# Patient Record
Sex: MALE | Race: WHITE | NOT HISPANIC OR LATINO | Employment: OTHER | URBAN - METROPOLITAN AREA
[De-identification: names, ages, dates, MRNs, and addresses within clinical notes are randomized per-mention and may not be internally consistent; named-entity substitution may affect disease eponyms.]

---

## 2017-01-20 RX ORDER — CARVEDILOL 6.25 MG/1
TABLET ORAL
Qty: 180 TABLET | Refills: 1 | Status: SHIPPED | OUTPATIENT
Start: 2017-01-20 | End: 2017-07-14 | Stop reason: SDUPTHER

## 2017-07-14 RX ORDER — CARVEDILOL 6.25 MG/1
TABLET ORAL
Qty: 180 TABLET | Refills: 1 | Status: SHIPPED | OUTPATIENT
Start: 2017-07-14 | End: 2017-07-19 | Stop reason: SDUPTHER

## 2017-07-19 ENCOUNTER — OFFICE VISIT (OUTPATIENT)
Dept: CARDIOLOGY | Facility: CLINIC | Age: 62
End: 2017-07-19

## 2017-07-19 VITALS
WEIGHT: 256 LBS | DIASTOLIC BLOOD PRESSURE: 89 MMHG | HEART RATE: 68 BPM | BODY MASS INDEX: 36.65 KG/M2 | SYSTOLIC BLOOD PRESSURE: 158 MMHG | HEIGHT: 70 IN

## 2017-07-19 DIAGNOSIS — I25.10 CORONARY ARTERY DISEASE INVOLVING NATIVE CORONARY ARTERY OF NATIVE HEART WITHOUT ANGINA PECTORIS: Primary | Chronic | ICD-10-CM

## 2017-07-19 DIAGNOSIS — I10 ESSENTIAL HYPERTENSION: Chronic | ICD-10-CM

## 2017-07-19 DIAGNOSIS — I50.32 CHRONIC DIASTOLIC HEART FAILURE (HCC): ICD-10-CM

## 2017-07-19 DIAGNOSIS — E78.2 MIXED HYPERLIPIDEMIA: Chronic | ICD-10-CM

## 2017-07-19 DIAGNOSIS — Z00.00 HEALTHCARE MAINTENANCE: ICD-10-CM

## 2017-07-19 PROCEDURE — 99214 OFFICE O/P EST MOD 30 MIN: CPT | Performed by: INTERNAL MEDICINE

## 2017-07-19 RX ORDER — OMEPRAZOLE 20 MG/1
20 CAPSULE, DELAYED RELEASE ORAL DAILY
COMMUNITY

## 2017-07-19 RX ORDER — DULOXETIN HYDROCHLORIDE 30 MG/1
60 CAPSULE, DELAYED RELEASE ORAL DAILY
COMMUNITY
End: 2018-10-15 | Stop reason: SDUPTHER

## 2017-07-19 RX ORDER — ATORVASTATIN CALCIUM 80 MG/1
80 TABLET, FILM COATED ORAL DAILY
Qty: 90 TABLET | Refills: 3 | Status: SHIPPED | OUTPATIENT
Start: 2017-07-19 | End: 2018-08-15 | Stop reason: SDUPTHER

## 2017-07-19 RX ORDER — INSULIN DEGLUDEC 200 U/ML
50 INJECTION, SOLUTION SUBCUTANEOUS NIGHTLY
Refills: 0 | COMMUNITY
Start: 2017-07-03 | End: 2022-06-06

## 2017-07-19 RX ORDER — LOSARTAN POTASSIUM 100 MG/1
100 TABLET ORAL DAILY
Qty: 90 TABLET | Refills: 3 | Status: SHIPPED | OUTPATIENT
Start: 2017-07-19 | End: 2020-06-29 | Stop reason: SDUPTHER

## 2017-07-19 RX ORDER — CARVEDILOL 6.25 MG/1
6.25 TABLET ORAL 2 TIMES DAILY WITH MEALS
Qty: 180 TABLET | Refills: 3 | Status: SHIPPED | OUTPATIENT
Start: 2017-07-19 | End: 2018-05-02 | Stop reason: SDUPTHER

## 2017-07-19 NOTE — PROGRESS NOTES
[facility] AT [address], [address]  [place], [place], [address] [phone]  WWW.[facility]           OUTPATIENT CLINIC FOLLOW UP NOTE    Encounter Date:10/19/2016    Patient Care Team:  Patient Care Team:  GEOFF Abbasi as PCP - General    Subjective:   Reason for consultation:   Chief Complaint   Patient presents with   • Hypertension   • Hyperlipidemia   • Coronary Artery Disease       HPI:    Gen Torres is a 62 y.o. male.  Hypertension     Hyperlipidemia     Coronary Artery Disease   Risk factors include hyperlipidemia.     The patient is a 61-year-old gentleman with a history of CAD status post 5 vessel bypass and an even more remote stent (last [facility] 2015 with patient SVG to PDA, SVG to OM, LIMA to LAD and diag), hypertension, hyperlipidemia, diabetes.  He presents today for follow-up.      Since his last visit in the clinic, he continues to deny symptoms of angina such as exertional chest pain.  He does occasionally feel short of breath and feel little bit more swollen.  He takes a PRN furosemide and his symptoms improved.  Shortness of breath is mild in severity.  It was exacerbated when he was taking a trip through the [facility].  He has not had any symptoms of uncontrolled high blood pressure such as headaches or vision changes.  His fasting blood sugars have improved and his A1c reportedly is also improved since his last visit when he was having occasional lethargy and diaphoresis with that sugars over 200    PFSH:  Patient Active Problem List   Diagnosis   • CAD (coronary artery disease)   • Obesity   • Diabetes mellitus   • Hyperlipidemia   • Hypertension   • KAYLEIGH on CPAP   • ED (erectile dysfunction)   • Chronic diastolic heart failure         Current Outpatient Prescriptions:   •  aspirin 81 MG tablet, Take 81 mg by mouth Daily., Disp: , Rfl:   •  atorvastatin (LIPITOR) 80 MG tablet, Take 1 tablet by mouth Daily., Disp: 90 tablet, Rfl:  "3  •  carvedilol (COREG) 6.25 MG tablet, take 1 tablet by mouth twice a day, Disp: 180 tablet, Rfl: 1  •  DULoxetine (CYMBALTA) 30 MG capsule, Take 30 mg by mouth Daily., Disp: , Rfl:   •  losartan (COZAAR) 100 MG tablet, Take 100 mg by mouth Daily., Disp: , Rfl:   •  metFORMIN (GLUCOPHAGE) 500 MG tablet, Take 500 mg by mouth 2 (Two) Times a Day With Meals., Disp: , Rfl:   •  omeprazole (priLOSEC) 20 MG capsule, Take 20 mg by mouth Daily., Disp: , Rfl:   •  TRESIBA FLEXTOUCH 200 UNIT/ML solution pen-injector, 48 Units Daily., Disp: , Rfl: 0    Allergies   Allergen Reactions   • Iodine    • Spironolactone Diarrhea     He was not able to tolerate the spironolactone secondary to diarrhea.        reports that he quit smoking about 23 years ago. He does not have any smokeless tobacco history on file.    Family History   Problem Relation Age of Onset   • Heart attack Father        Review of Systems:  Positive for occasional dyspnea with exertion/\"smothering\"  Negative for exertional chest pain, orthopnea, PND, lower extremity edema, palpitations, lightheadedness, syncope.   Review of Systems  All other systems reviewed and are negative.    Objective:   Blood pressure 158/89, pulse 68, height 70\" (177.8 cm), weight 256 lb (116 kg).  Physical Exam  CONSTITUTIONAL: No acute distress, normal affect  RESPIRATORY: Normal effort. Clear to auscultation bilaterally without wheezing or rales  CARDIOVASCULAR: Jugular venous pressure within normal limits. Carotids with normal upstrokes without bruits.  Regular rate and rhythm with normal S1 and S2. Without murmur, gallop or rub.  PERIPHERAL VASCULAR: Normal radial pulses bilaterally, normal posterior tibial pulses bilaterally. There is no lower extremity edema bilaterally.      Labs:  Lab Results   Component Value Date    ALT 38 10/19/2016    AST 28 10/19/2016     Lab Results   Component Value Date    TRIG 257 (H) 07/10/2015    HDL 26 (L) 07/10/2015    LDLDIRECT 74 10/19/2016    " CREATININE 0.90 10/19/2016       Diagnostic Data:    Procedures  TTE 3/2015 results reviewed  Conclusions  1. The estimated ejection fraction is 50-55%.    2. Post surgical hypokinesis of the interventricular septum is observed  consistent with coronary artery bypass.    3. Abnormal left ventricular diastolic filling is observed, consistent  with impaired relaxation.    4. The tricuspid valve leaflets are normal.    5. The tricuspid valve leaflets are not thickened.  6. Tricuspid valve leaflet mobility appears normal.    Assessment and Plan:   Gen was seen today for hypertension, hyperlipidemia and coronary artery disease.    Diagnoses and all orders for this visit:    Coronary artery disease involving native coronary artery of native heart without angina pectoris  -CCS class 0, continue current medications including aspirin, carvedilol    Chronic diastolic heart failure  -Continue when necessary furosemide    Essential hypertension  -Blood pressure is mildly elevated today.  It was normal at a recent primary care visit as well as at his last visit with us.  We'll continue to monitor.  The patient will check his blood pressure a few times over the next couple weeks in the morning.  If the averages over 140/90, would increase his carvedilol to 12.5 mg twice a day.  -Otherwise, continue carvedilol, losartan    Mixed hyperlipidemia  -We'll obtain his recent blood work.  Continue atorvastatin    - Dietary and exercise counseling was provided during this visit  - Return in about 9 months (around 4/19/2018).    Hari Vargas MD, MSc, [facility]  Interventional Cardiology  [facility] at [facility]

## 2017-08-18 ENCOUNTER — DOCUMENTATION (OUTPATIENT)
Dept: CARDIOLOGY | Facility: CLINIC | Age: 62
End: 2017-08-18

## 2017-08-18 NOTE — PROGRESS NOTES
Outside hospital labs    8/5/17 labs-hemoglobin 14, platelets 126, A1c 6.4%, no lipid panel or liver function tests

## 2018-05-02 ENCOUNTER — OFFICE VISIT (OUTPATIENT)
Dept: CARDIOLOGY | Facility: CLINIC | Age: 63
End: 2018-05-02

## 2018-05-02 VITALS
HEIGHT: 70 IN | DIASTOLIC BLOOD PRESSURE: 95 MMHG | HEART RATE: 68 BPM | WEIGHT: 255 LBS | BODY MASS INDEX: 36.51 KG/M2 | SYSTOLIC BLOOD PRESSURE: 146 MMHG

## 2018-05-02 DIAGNOSIS — E78.2 MIXED HYPERLIPIDEMIA: ICD-10-CM

## 2018-05-02 DIAGNOSIS — I10 ESSENTIAL HYPERTENSION: Chronic | ICD-10-CM

## 2018-05-02 DIAGNOSIS — I50.32 CHRONIC DIASTOLIC HEART FAILURE (HCC): ICD-10-CM

## 2018-05-02 DIAGNOSIS — I25.118 CORONARY ARTERY DISEASE OF NATIVE ARTERY OF NATIVE HEART WITH STABLE ANGINA PECTORIS (HCC): Primary | ICD-10-CM

## 2018-05-02 PROCEDURE — 99214 OFFICE O/P EST MOD 30 MIN: CPT | Performed by: INTERNAL MEDICINE

## 2018-05-02 RX ORDER — FUROSEMIDE 20 MG/1
20 TABLET ORAL 2 TIMES DAILY
Qty: 60 TABLET | Refills: 11 | Status: ON HOLD | OUTPATIENT
Start: 2018-05-02 | End: 2018-05-09

## 2018-05-02 RX ORDER — CARVEDILOL 12.5 MG/1
12.5 TABLET ORAL 2 TIMES DAILY WITH MEALS
Qty: 60 TABLET | Refills: 11 | Status: SHIPPED | OUTPATIENT
Start: 2018-05-02 | End: 2019-02-20 | Stop reason: SDUPTHER

## 2018-05-02 NOTE — PROGRESS NOTES
[facility] AT [address], [address]  [place], [place], [address] [phone]  WWW[facility]           OUTPATIENT CLINIC FOLLOW UP NOTE    Patient Care Team:  Patient Care Team:  GEOFF Abbasi as PCP - General    Subjective:   Reason for consultation:   Chief Complaint   Patient presents with   • Coronary Artery Disease   • Hypertension       HPI:    Gen Torres is a 62 y.o. male.  Hypertension     Hyperlipidemia     Coronary Artery Disease   Risk factors include hyperlipidemia.     The patient has a history of CAD status post 5 vessel bypass and an even more remote stent (last [facility] 2015 with patient SVG to PDA, SVG to OM, LIMA to LAD and diag), hypertension, hyperlipidemia, diabetes.  He presents today for follow-up.      Since his last visit in the clinic, He has developed exertional shortness of breath and occasionally a atypical chest heaviness.  Over the last few weeks the patient has had a clear discernible shortness of breath.  He took Lasix for a few days which helped only partly.  He occasionally notices associated lower extremity swelling.  Once he felt a right-sided chest pain but this was self-limiting.  Occasionally he will feel a little heavy in the chest with this shortness of breath.  He denies significant seasonal allergies associated with the symptoms.      PFSH:  Patient Active Problem List   Diagnosis   • Coronary artery disease involving native coronary artery of native heart without angina pectoris   • Obesity   • Diabetes mellitus   • Mixed hyperlipidemia   • Essential hypertension   • KAYLEIGH on CPAP   • ED (erectile dysfunction)   • Chronic diastolic heart failure         Current Outpatient Prescriptions:   •  aspirin 81 MG tablet, Take 1 tablet by mouth Daily., Disp: 90 tablet, Rfl: 3  •  atorvastatin (LIPITOR) 80 MG tablet, Take 1 tablet by mouth Daily., Disp: 90 tablet, Rfl: 3  •  carvedilol (COREG) 12.5 MG tablet, Take 1 tablet by  "mouth 2 (Two) Times a Day With Meals., Disp: 60 tablet, Rfl: 11  •  DULoxetine (CYMBALTA) 30 MG capsule, Take 60 mg by mouth Daily., Disp: , Rfl:   •  losartan (COZAAR) 100 MG tablet, Take 1 tablet by mouth Daily., Disp: 90 tablet, Rfl: 3  •  metFORMIN (GLUCOPHAGE) 500 MG tablet, Take 500 mg by mouth 2 (Two) Times a Day With Meals., Disp: , Rfl:   •  omeprazole (priLOSEC) 20 MG capsule, Take 20 mg by mouth Daily., Disp: , Rfl:   •  TRESIBA FLEXTOUCH 200 UNIT/ML solution pen-injector, 50 Units Daily., Disp: , Rfl: 0  •  furosemide (LASIX) 20 MG tablet, Take 1 tablet by mouth 2 (Two) Times a Day., Disp: 60 tablet, Rfl: 11    Allergies   Allergen Reactions   • Spironolactone Diarrhea     He was not able to tolerate the spironolactone secondary to diarrhea.   • Iodine Rash        reports that he quit smoking about 24 years ago. He does not have any smokeless tobacco history on file.    Family History   Problem Relation Age of Onset   • Heart attack Father        Review of Systems:  Positive for occasional dyspnea with exertion/\"smothering\"  Negative for exertional chest pain, orthopnea, PND, lower extremity edema, palpitations, lightheadedness, syncope.   Review of Systems  All other systems reviewed and are negative.    Objective:   Blood pressure 146/95, pulse 68, height 177.8 cm (70\"), weight 116 kg (255 lb).  Physical Exam  CONSTITUTIONAL: No acute distress, normal affect  RESPIRATORY: Normal effort. Clear to auscultation bilaterally without wheezing or rales  CARDIOVASCULAR: Carotids with normal upstrokes without bruits.  Regular rate and rhythm with normal S1 and S2. Without murmur, gallop or rub.  PERIPHERAL VASCULAR: Normal radial pulses bilaterally, normal posterior tibial pulses bilaterally. There is no lower extremity edema bilaterally.      Labs:  Lab Results   Component Value Date    ALT 38 10/19/2016    AST 28 10/19/2016     Lab Results   Component Value Date    TRIG 257 (H) 07/10/2015    HDL 26 (L) " 07/10/2015    CREATININE 0.90 10/19/2016       Diagnostic Data:    Procedures  TTE 3/2015 results reviewed  Conclusions  1. The estimated ejection fraction is 50-55%.    2. Post surgical hypokinesis of the interventricular septum is observed  consistent with coronary artery bypass.    3. Abnormal left ventricular diastolic filling is observed, consistent  with impaired relaxation.    4. The tricuspid valve leaflets are normal.    5. The tricuspid valve leaflets are not thickened.  6. Tricuspid valve leaflet mobility appears normal.    Assessment and Plan:   Gen was seen today for hypertension, hyperlipidemia and coronary artery disease.    Diagnoses and all orders for this visit:    Coronary artery disease involving native coronary artery of native heart without angina pectoris  -CCS class 2, NYHA II-III symptoms  -Continue current medications including aspirin, carvedilol, atorvastatin  -[facility], left radial approach  -Repeat TTE at time of C    Chronic diastolic heart failure  -Furosemide 20mg daily for now  -Repeat K+ at time of [facility]  -Can also consider switching back to PRN     Essential hypertension  -Blood pressure is elevated today.    -Increased carvedilol to 12.5 mg twice a day.    Mixed hyperlipidemia  -Repeat FLP, LFTs at time of C    - Of note patient will be traveling for several months this summer starting in July. [place]    - No Follow-up on file.  Follow-up will be scheduled after heart catheter    Hari Vargas MD, MSc, [facility]  Interventional Cardiology  [facility] at [facility]

## 2018-05-03 ENCOUNTER — PREP FOR SURGERY (OUTPATIENT)
Dept: OTHER | Facility: HOSPITAL | Age: 63
End: 2018-05-03

## 2018-05-03 DIAGNOSIS — I25.118 CORONARY ARTERY DISEASE OF NATIVE ARTERY OF NATIVE HEART WITH STABLE ANGINA PECTORIS (HCC): Primary | ICD-10-CM

## 2018-05-03 DIAGNOSIS — E11.65 TYPE 2 DIABETES MELLITUS WITH HYPERGLYCEMIA, UNSPECIFIED LONG TERM INSULIN USE STATUS: ICD-10-CM

## 2018-05-03 RX ORDER — SODIUM CHLORIDE 9 MG/ML
2.84 INJECTION, SOLUTION INTRAVENOUS CONTINUOUS
Status: CANCELLED | OUTPATIENT
Start: 2018-05-03 | End: 2018-05-03

## 2018-05-03 RX ORDER — ONDANSETRON 2 MG/ML
4 INJECTION INTRAMUSCULAR; INTRAVENOUS EVERY 6 HOURS PRN
Status: CANCELLED | OUTPATIENT
Start: 2018-05-03

## 2018-05-03 RX ORDER — ASPIRIN 81 MG/1
324 TABLET, CHEWABLE ORAL ONCE
Status: CANCELLED | OUTPATIENT
Start: 2018-05-03 | End: 2018-05-03

## 2018-05-03 RX ORDER — ASPIRIN 81 MG/1
81 TABLET ORAL DAILY
Status: CANCELLED | OUTPATIENT
Start: 2018-05-04

## 2018-05-03 RX ORDER — SODIUM CHLORIDE 0.9 % (FLUSH) 0.9 %
1-10 SYRINGE (ML) INJECTION AS NEEDED
Status: CANCELLED | OUTPATIENT
Start: 2018-05-03

## 2018-05-03 RX ORDER — ACETAMINOPHEN 325 MG/1
650 TABLET ORAL EVERY 4 HOURS PRN
Status: CANCELLED | OUTPATIENT
Start: 2018-05-03

## 2018-05-07 ENCOUNTER — TELEPHONE (OUTPATIENT)
Dept: CARDIOLOGY | Facility: CLINIC | Age: 63
End: 2018-05-07

## 2018-05-07 RX ORDER — PREDNISONE 20 MG/1
40 TABLET ORAL EVERY MORNING
Qty: 4 TABLET | Refills: 0 | Status: SHIPPED | OUTPATIENT
Start: 2018-05-08 | End: 2018-10-15

## 2018-05-07 RX ORDER — CIMETIDINE 400 MG/1
400 TABLET, FILM COATED ORAL EVERY MORNING
Qty: 2 TABLET | Refills: 0 | Status: SHIPPED | OUTPATIENT
Start: 2018-05-08 | End: 2018-10-15

## 2018-05-07 NOTE — TELEPHONE ENCOUNTER
I spoke with the patient's spouse regarding premedication instructions for LHC.  I advised that he take 50 mg benadryl the morning before and morning of procedure, as well as 40 mg prednisone and 400 mg tagamet at the same times.  All questions and concerns addressed at this time.  Understanding verbalized.

## 2018-05-08 ENCOUNTER — APPOINTMENT (OUTPATIENT)
Dept: PREADMISSION TESTING | Facility: HOSPITAL | Age: 63
End: 2018-05-08

## 2018-05-08 DIAGNOSIS — I25.118 CORONARY ARTERY DISEASE OF NATIVE ARTERY OF NATIVE HEART WITH STABLE ANGINA PECTORIS (HCC): ICD-10-CM

## 2018-05-08 DIAGNOSIS — E11.65 TYPE 2 DIABETES MELLITUS WITH HYPERGLYCEMIA, UNSPECIFIED LONG TERM INSULIN USE STATUS: ICD-10-CM

## 2018-05-08 LAB
ALBUMIN SERPL-MCNC: 4.5 G/DL (ref 3.2–4.8)
ALBUMIN/GLOB SERPL: 1.9 G/DL (ref 1.5–2.5)
ALP SERPL-CCNC: 82 U/L (ref 25–100)
ALT SERPL W P-5'-P-CCNC: 31 U/L (ref 7–40)
ANION GAP SERPL CALCULATED.3IONS-SCNC: 7 MMOL/L (ref 3–11)
AST SERPL-CCNC: 21 U/L (ref 0–33)
BILIRUB SERPL-MCNC: 1 MG/DL (ref 0.3–1.2)
BUN BLD-MCNC: 18 MG/DL (ref 9–23)
BUN/CREAT SERPL: 12.9 (ref 7–25)
CALCIUM SPEC-SCNC: 9.2 MG/DL (ref 8.7–10.4)
CHLORIDE SERPL-SCNC: 103 MMOL/L (ref 99–109)
CO2 SERPL-SCNC: 25 MMOL/L (ref 20–31)
CREAT BLD-MCNC: 1.4 MG/DL (ref 0.6–1.3)
DEPRECATED RDW RBC AUTO: 44.7 FL (ref 37–54)
ERYTHROCYTE [DISTWIDTH] IN BLOOD BY AUTOMATED COUNT: 13.7 % (ref 11.3–14.5)
GFR SERPL CREATININE-BSD FRML MDRD: 52 ML/MIN/1.73
GLOBULIN UR ELPH-MCNC: 2.4 GM/DL
GLUCOSE BLD-MCNC: 345 MG/DL (ref 70–100)
HBA1C MFR BLD: 7.3 % (ref 4.8–5.6)
HCT VFR BLD AUTO: 43.4 % (ref 38.9–50.9)
HGB BLD-MCNC: 15.4 G/DL (ref 13.1–17.5)
INR PPP: 1.01 (ref 0.91–1.09)
MCH RBC QN AUTO: 32.4 PG (ref 27–31)
MCHC RBC AUTO-ENTMCNC: 35.5 G/DL (ref 32–36)
MCV RBC AUTO: 91.4 FL (ref 80–99)
PLATELET # BLD AUTO: 118 10*3/MM3 (ref 150–450)
PMV BLD AUTO: 9.3 FL (ref 6–12)
POTASSIUM BLD-SCNC: 4.7 MMOL/L (ref 3.5–5.5)
PROT SERPL-MCNC: 6.9 G/DL (ref 5.7–8.2)
PROTHROMBIN TIME: 10.6 SECONDS (ref 9.6–11.5)
RBC # BLD AUTO: 4.75 10*6/MM3 (ref 4.2–5.76)
SODIUM BLD-SCNC: 135 MMOL/L (ref 132–146)
WBC NRBC COR # BLD: 7.72 10*3/MM3 (ref 3.5–10.8)

## 2018-05-08 PROCEDURE — 83880 ASSAY OF NATRIURETIC PEPTIDE: CPT | Performed by: INTERNAL MEDICINE

## 2018-05-08 PROCEDURE — 80053 COMPREHEN METABOLIC PANEL: CPT | Performed by: NURSE PRACTITIONER

## 2018-05-08 PROCEDURE — 36415 COLL VENOUS BLD VENIPUNCTURE: CPT

## 2018-05-08 PROCEDURE — 85027 COMPLETE CBC AUTOMATED: CPT | Performed by: NURSE PRACTITIONER

## 2018-05-08 PROCEDURE — 85610 PROTHROMBIN TIME: CPT | Performed by: NURSE PRACTITIONER

## 2018-05-08 PROCEDURE — 83036 HEMOGLOBIN GLYCOSYLATED A1C: CPT | Performed by: NURSE PRACTITIONER

## 2018-05-08 NOTE — DISCHARGE INSTRUCTIONS
"Dear Patient,    Drink plenty of fluids the day before to ensure you are well hydrated, unless otherwise directed by your physician.    Do NOT eat after midnight the night before your procedure.   You may have clear liquids only up to three hours before your scheduled arrival time (Water is best, but clear liquids can also include coffee without cream or milk, fruit juice without pulp, clear broth, and clear gelatin)    We encourage you to drink 8 ounces of water three to four hours before your scheduled arrival time.    Take your medications as instructed by your doctor.    Following your procedure, be sure to drink plenty of fluids to continue flushing the kidneys.   Benefits of hydrating before and after your procedure include:    -improved hydration helps prevent potential harm to the kidneys    by flushing the contrast/dye used during your procedure    -Lower post-procedure complications    -Improved patient comfort     Do NOT smoke after midnight the night before your procedure.    Glasses and jewelry may be worn, but dentures must be removed prior to your procedure.    Leave any items you consider valuable at home.      MORNING of your Procedure, please bring the following:     -Photo ID and insurance card(s)    -ALL medications in their ORIGINAL CONTAINERS    -Co-pay and/or deductible required by your insurance   -Copy of living will or power of  document (if not brought to    Pre-Admission Testing department)   -CPAP mask and tubing, not your machine (if applicable)    -Relaxation aids (music, books, magazines)    Family members may wait in [facility] waiting area during procedure.    Need to make arrangements for transportation prior to discharge.    A handout regarding \"Heart Healthy Eating\" was provided today to encourage healthy eating habits.    Booklet published by [employer] was given in Pre-Admission testing.  This booklet is for informational purposes only.  If you have any questions about your " procedure, please speak with your physician.

## 2018-05-09 ENCOUNTER — APPOINTMENT (OUTPATIENT)
Dept: CARDIOLOGY | Facility: HOSPITAL | Age: 63
End: 2018-05-09

## 2018-05-09 ENCOUNTER — HOSPITAL ENCOUNTER (OUTPATIENT)
Facility: HOSPITAL | Age: 63
Discharge: HOME | End: 2018-05-09
Attending: INTERNAL MEDICINE | Admitting: INTERNAL MEDICINE

## 2018-05-09 VITALS
DIASTOLIC BLOOD PRESSURE: 79 MMHG | TEMPERATURE: 97.7 F | HEART RATE: 74 BPM | HEIGHT: 70 IN | SYSTOLIC BLOOD PRESSURE: 146 MMHG | RESPIRATION RATE: 18 BRPM | OXYGEN SATURATION: 96 % | BODY MASS INDEX: 36.55 KG/M2 | WEIGHT: 255.29 LBS

## 2018-05-09 DIAGNOSIS — I25.118 CORONARY ARTERY DISEASE OF NATIVE ARTERY OF NATIVE HEART WITH STABLE ANGINA PECTORIS (HCC): ICD-10-CM

## 2018-05-09 LAB
ACT BLD: 246 SECONDS (ref 82–152)
ACT BLD: 246 SECONDS (ref 82–152)
ACT BLD: 252 SECONDS (ref 82–152)
ARTICHOKE IGE QN: 112 MG/DL (ref 0–130)
BH CV ECHO MEAS - AO MAX PG (FULL): 6 MMHG
BH CV ECHO MEAS - AO MAX PG: 12 MMHG
BH CV ECHO MEAS - AO MEAN PG (FULL): 3.3 MMHG
BH CV ECHO MEAS - AO MEAN PG: 6.3 MMHG
BH CV ECHO MEAS - AO ROOT AREA (BSA CORRECTED): 1.3
BH CV ECHO MEAS - AO ROOT AREA: 7.5 CM^2
BH CV ECHO MEAS - AO ROOT DIAM: 3.1 CM
BH CV ECHO MEAS - AO V2 MAX: 173.6 CM/SEC
BH CV ECHO MEAS - AO V2 MEAN: 118.1 CM/SEC
BH CV ECHO MEAS - AO V2 VTI: 36.6 CM
BH CV ECHO MEAS - AVA(I,A): 2.2 CM^2
BH CV ECHO MEAS - AVA(I,D): 2.2 CM^2
BH CV ECHO MEAS - AVA(V,A): 2.3 CM^2
BH CV ECHO MEAS - AVA(V,D): 2.3 CM^2
BH CV ECHO MEAS - BSA(HAYCOCK): 2.4 M^2
BH CV ECHO MEAS - BSA: 2.3 M^2
BH CV ECHO MEAS - BZI_BMI: 36.6 KILOGRAMS/M^2
BH CV ECHO MEAS - BZI_METRIC_HEIGHT: 177.8 CM
BH CV ECHO MEAS - BZI_METRIC_WEIGHT: 115.7 KG
BH CV ECHO MEAS - EDV(CUBED): 190.2 ML
BH CV ECHO MEAS - EDV(TEICH): 163.3 ML
BH CV ECHO MEAS - EF(CUBED): 61.2 %
BH CV ECHO MEAS - EF(TEICH): 52 %
BH CV ECHO MEAS - ESV(CUBED): 73.8 ML
BH CV ECHO MEAS - ESV(TEICH): 78.3 ML
BH CV ECHO MEAS - FS: 27.1 %
BH CV ECHO MEAS - IVS/LVPW: 0.83
BH CV ECHO MEAS - IVSD: 0.84 CM
BH CV ECHO MEAS - LA DIMENSION: 4.7 CM
BH CV ECHO MEAS - LA/AO: 1.5
BH CV ECHO MEAS - LV MASS(C)D: 208.8 GRAMS
BH CV ECHO MEAS - LV MASS(C)DI: 90.2 GRAMS/M^2
BH CV ECHO MEAS - LV MAX PG: 6 MMHG
BH CV ECHO MEAS - LV MEAN PG: 3 MMHG
BH CV ECHO MEAS - LV V1 MAX: 122.3 CM/SEC
BH CV ECHO MEAS - LV V1 MEAN: 81.4 CM/SEC
BH CV ECHO MEAS - LV V1 VTI: 24.9 CM
BH CV ECHO MEAS - LVIDD: 5.8 CM
BH CV ECHO MEAS - LVIDS: 4.2 CM
BH CV ECHO MEAS - LVOT AREA (M): 3.1 CM^2
BH CV ECHO MEAS - LVOT AREA: 3.2 CM^2
BH CV ECHO MEAS - LVOT DIAM: 2 CM
BH CV ECHO MEAS - LVPWD: 1 CM
BH CV ECHO MEAS - MR MAX PG: 98.4 MMHG
BH CV ECHO MEAS - MR MAX VEL: 496.1 CM/SEC
BH CV ECHO MEAS - MR MEAN PG: 69.1 MMHG
BH CV ECHO MEAS - MR MEAN VEL: 393.4 CM/SEC
BH CV ECHO MEAS - MR VTI: 177.2 CM
BH CV ECHO MEAS - MV A MAX VEL: 118.5 CM/SEC
BH CV ECHO MEAS - MV DEC SLOPE: 288.6 CM/SEC^2
BH CV ECHO MEAS - MV DEC TIME: 0.23 SEC
BH CV ECHO MEAS - MV E MAX VEL: 98.7 CM/SEC
BH CV ECHO MEAS - MV E/A: 0.83
BH CV ECHO MEAS - MV MAX PG: 6.2 MMHG
BH CV ECHO MEAS - MV MEAN PG: 2.8 MMHG
BH CV ECHO MEAS - MV P1/2T MAX VEL: 111.8 CM/SEC
BH CV ECHO MEAS - MV P1/2T: 113.5 MSEC
BH CV ECHO MEAS - MV V2 MAX: 124.9 CM/SEC
BH CV ECHO MEAS - MV V2 MEAN: 78 CM/SEC
BH CV ECHO MEAS - MV V2 VTI: 46.2 CM
BH CV ECHO MEAS - MVA P1/2T LCG: 2 CM^2
BH CV ECHO MEAS - MVA(P1/2T): 1.9 CM^2
BH CV ECHO MEAS - MVA(VTI): 1.7 CM^2
BH CV ECHO MEAS - PA ACC SLOPE: 641.2 CM/SEC^2
BH CV ECHO MEAS - PA ACC TIME: 0.11 SEC
BH CV ECHO MEAS - PA MAX PG: 7.1 MMHG
BH CV ECHO MEAS - PA PR(ACCEL): 31.5 MMHG
BH CV ECHO MEAS - PA V2 MAX: 133.6 CM/SEC
BH CV ECHO MEAS - SI(AO): 119 ML/M^2
BH CV ECHO MEAS - SI(CUBED): 50.3 ML/M^2
BH CV ECHO MEAS - SI(LVOT): 34.8 ML/M^2
BH CV ECHO MEAS - SI(TEICH): 36.7 ML/M^2
BH CV ECHO MEAS - SV(AO): 275.5 ML
BH CV ECHO MEAS - SV(CUBED): 116.4 ML
BH CV ECHO MEAS - SV(LVOT): 80.5 ML
BH CV ECHO MEAS - SV(TEICH): 85 ML
BH CV VAS BP LEFT ARM: NORMAL MMHG
BNP SERPL-MCNC: 12 PG/ML (ref 0–100)
CHOLEST SERPL-MCNC: 160 MG/DL (ref 0–200)
HDLC SERPL-MCNC: 28 MG/DL (ref 40–60)
LEFT ATRIUM VOLUME INDEX: 34 ML/M2
LEFT ATRIUM VOLUME: 79 CM3
LV EF 2D ECHO EST: 55 %
TRIGL SERPL-MCNC: 214 MG/DL (ref 0–150)

## 2018-05-09 PROCEDURE — 25010000002 MIDAZOLAM PER 1 MG: Performed by: INTERNAL MEDICINE

## 2018-05-09 PROCEDURE — C1753 CATH, INTRAVAS ULTRASOUND: HCPCS | Performed by: INTERNAL MEDICINE

## 2018-05-09 PROCEDURE — C1725 CATH, TRANSLUMIN NON-LASER: HCPCS | Performed by: INTERNAL MEDICINE

## 2018-05-09 PROCEDURE — 0 IOPAMIDOL PER 1 ML: Performed by: INTERNAL MEDICINE

## 2018-05-09 PROCEDURE — 92937 PRQ TRLUML REVSC CAB GRF 1: CPT | Performed by: INTERNAL MEDICINE

## 2018-05-09 PROCEDURE — C1769 GUIDE WIRE: HCPCS | Performed by: INTERNAL MEDICINE

## 2018-05-09 PROCEDURE — C9604 PERC D-E COR REVASC T CABG S: HCPCS | Performed by: INTERNAL MEDICINE

## 2018-05-09 PROCEDURE — 93306 TTE W/DOPPLER COMPLETE: CPT

## 2018-05-09 PROCEDURE — 36415 COLL VENOUS BLD VENIPUNCTURE: CPT

## 2018-05-09 PROCEDURE — 25010000002 FENTANYL CITRATE (PF) 100 MCG/2ML SOLUTION: Performed by: INTERNAL MEDICINE

## 2018-05-09 PROCEDURE — C1887 CATHETER, GUIDING: HCPCS | Performed by: INTERNAL MEDICINE

## 2018-05-09 PROCEDURE — 92978 ENDOLUMINL IVUS OCT C 1ST: CPT | Performed by: INTERNAL MEDICINE

## 2018-05-09 PROCEDURE — 93459 L HRT ART/GRFT ANGIO: CPT | Performed by: INTERNAL MEDICINE

## 2018-05-09 PROCEDURE — 93571 IV DOP VEL&/PRESS C FLO 1ST: CPT | Performed by: INTERNAL MEDICINE

## 2018-05-09 PROCEDURE — 80061 LIPID PANEL: CPT | Performed by: NURSE PRACTITIONER

## 2018-05-09 PROCEDURE — 93306 TTE W/DOPPLER COMPLETE: CPT | Performed by: INTERNAL MEDICINE

## 2018-05-09 PROCEDURE — C1894 INTRO/SHEATH, NON-LASER: HCPCS | Performed by: INTERNAL MEDICINE

## 2018-05-09 PROCEDURE — 25010000002 HEPARIN (PORCINE) PER 1000 UNITS: Performed by: INTERNAL MEDICINE

## 2018-05-09 PROCEDURE — 85347 COAGULATION TIME ACTIVATED: CPT

## 2018-05-09 PROCEDURE — C1874 STENT, COATED/COV W/DEL SYS: HCPCS | Performed by: INTERNAL MEDICINE

## 2018-05-09 PROCEDURE — 25010000002 SULFUR HEXAFLUORIDE MICROSPH 60.7-25 MG RECONSTITUTED SUSPENSION: Performed by: INTERNAL MEDICINE

## 2018-05-09 DEVICE — XIENCE ALPINE EVEROLIMUS ELUTING CORONARY STENT SYSTEM 2.75 MM X 12 MM / RAPID-EXCHANGE
Type: IMPLANTABLE DEVICE | Status: FUNCTIONAL
Brand: XIENCE ALPINE

## 2018-05-09 RX ORDER — ONDANSETRON 2 MG/ML
4 INJECTION INTRAMUSCULAR; INTRAVENOUS EVERY 6 HOURS PRN
Status: DISCONTINUED | OUTPATIENT
Start: 2018-05-09 | End: 2018-05-09 | Stop reason: HOSPADM

## 2018-05-09 RX ORDER — CLOPIDOGREL BISULFATE 75 MG/1
TABLET ORAL AS NEEDED
Status: DISCONTINUED | OUTPATIENT
Start: 2018-05-09 | End: 2018-05-09 | Stop reason: HOSPADM

## 2018-05-09 RX ORDER — FUROSEMIDE 20 MG/1
20 TABLET ORAL DAILY PRN
Qty: 60 TABLET | Refills: 11 | Status: SHIPPED | OUTPATIENT
Start: 2018-05-09 | End: 2021-12-06 | Stop reason: SDUPTHER

## 2018-05-09 RX ORDER — LIDOCAINE HYDROCHLORIDE 10 MG/ML
INJECTION, SOLUTION EPIDURAL; INFILTRATION; INTRACAUDAL; PERINEURAL AS NEEDED
Status: DISCONTINUED | OUTPATIENT
Start: 2018-05-09 | End: 2018-05-09 | Stop reason: HOSPADM

## 2018-05-09 RX ORDER — FENTANYL CITRATE 50 UG/ML
INJECTION, SOLUTION INTRAMUSCULAR; INTRAVENOUS AS NEEDED
Status: DISCONTINUED | OUTPATIENT
Start: 2018-05-09 | End: 2018-05-09 | Stop reason: HOSPADM

## 2018-05-09 RX ORDER — ASPIRIN 81 MG/1
81 TABLET ORAL DAILY
Status: DISCONTINUED | OUTPATIENT
Start: 2018-05-10 | End: 2018-05-09 | Stop reason: HOSPADM

## 2018-05-09 RX ORDER — SODIUM CHLORIDE 0.9 % (FLUSH) 0.9 %
1-10 SYRINGE (ML) INJECTION AS NEEDED
Status: DISCONTINUED | OUTPATIENT
Start: 2018-05-09 | End: 2018-05-09 | Stop reason: HOSPADM

## 2018-05-09 RX ORDER — MIDAZOLAM HYDROCHLORIDE 1 MG/ML
INJECTION INTRAMUSCULAR; INTRAVENOUS AS NEEDED
Status: DISCONTINUED | OUTPATIENT
Start: 2018-05-09 | End: 2018-05-09 | Stop reason: HOSPADM

## 2018-05-09 RX ORDER — ASPIRIN 81 MG/1
324 TABLET, CHEWABLE ORAL ONCE
Status: COMPLETED | OUTPATIENT
Start: 2018-05-09 | End: 2018-05-09

## 2018-05-09 RX ORDER — HEPARIN SODIUM 1000 [USP'U]/ML
INJECTION, SOLUTION INTRAVENOUS; SUBCUTANEOUS AS NEEDED
Status: DISCONTINUED | OUTPATIENT
Start: 2018-05-09 | End: 2018-05-09 | Stop reason: HOSPADM

## 2018-05-09 RX ORDER — CLOPIDOGREL BISULFATE 75 MG/1
75 TABLET ORAL DAILY
Qty: 30 TABLET | Refills: 11 | Status: SHIPPED | OUTPATIENT
Start: 2018-05-09 | End: 2019-02-20 | Stop reason: SDUPTHER

## 2018-05-09 RX ORDER — ACETAMINOPHEN 325 MG/1
650 TABLET ORAL EVERY 4 HOURS PRN
Status: DISCONTINUED | OUTPATIENT
Start: 2018-05-09 | End: 2018-05-09 | Stop reason: HOSPADM

## 2018-05-09 RX ORDER — SODIUM CHLORIDE 9 MG/ML
2.84 INJECTION, SOLUTION INTRAVENOUS CONTINUOUS
Status: ACTIVE | OUTPATIENT
Start: 2018-05-09 | End: 2018-05-09

## 2018-05-09 RX ORDER — SODIUM CHLORIDE 9 MG/ML
INJECTION, SOLUTION INTRAVENOUS CONTINUOUS PRN
Status: DISCONTINUED | OUTPATIENT
Start: 2018-05-09 | End: 2018-05-09 | Stop reason: HOSPADM

## 2018-05-09 RX ADMIN — SULFUR HEXAFLUORIDE 2 ML: KIT at 11:40

## 2018-05-09 RX ADMIN — ASPIRIN 81 MG 324 MG: 81 TABLET ORAL at 08:56

## 2018-05-09 RX ADMIN — SODIUM CHLORIDE 2.84 ML/KG/HR: 9 INJECTION, SOLUTION INTRAVENOUS at 08:56

## 2018-05-09 NOTE — H&P (VIEW-ONLY)
[facility] AT [address], [address]  [place], [place], [address] [phone]  WWW[facility]           OUTPATIENT CLINIC FOLLOW UP NOTE    Patient Care Team:  Patient Care Team:  GEOFF Abbasi as PCP - General    Subjective:   Reason for consultation:   Chief Complaint   Patient presents with   • Coronary Artery Disease   • Hypertension       HPI:    Gen Torres is a 62 y.o. male.  Hypertension     Hyperlipidemia     Coronary Artery Disease   Risk factors include hyperlipidemia.     The patient has a history of CAD status post 5 vessel bypass and an even more remote stent (last [facility] 2015 with patient SVG to PDA, SVG to OM, LIMA to LAD and diag), hypertension, hyperlipidemia, diabetes.  He presents today for follow-up.      Since his last visit in the clinic, He has developed exertional shortness of breath and occasionally a atypical chest heaviness.  Over the last few weeks the patient has had a clear discernible shortness of breath.  He took Lasix for a few days which helped only partly.  He occasionally notices associated lower extremity swelling.  Once he felt a right-sided chest pain but this was self-limiting.  Occasionally he will feel a little heavy in the chest with this shortness of breath.  He denies significant seasonal allergies associated with the symptoms.      PFSH:  Patient Active Problem List   Diagnosis   • Coronary artery disease involving native coronary artery of native heart without angina pectoris   • Obesity   • Diabetes mellitus   • Mixed hyperlipidemia   • Essential hypertension   • KAYLEIGH on CPAP   • ED (erectile dysfunction)   • Chronic diastolic heart failure         Current Outpatient Prescriptions:   •  aspirin 81 MG tablet, Take 1 tablet by mouth Daily., Disp: 90 tablet, Rfl: 3  •  atorvastatin (LIPITOR) 80 MG tablet, Take 1 tablet by mouth Daily., Disp: 90 tablet, Rfl: 3  •  carvedilol (COREG) 12.5 MG tablet, Take 1 tablet by  "mouth 2 (Two) Times a Day With Meals., Disp: 60 tablet, Rfl: 11  •  DULoxetine (CYMBALTA) 30 MG capsule, Take 60 mg by mouth Daily., Disp: , Rfl:   •  losartan (COZAAR) 100 MG tablet, Take 1 tablet by mouth Daily., Disp: 90 tablet, Rfl: 3  •  metFORMIN (GLUCOPHAGE) 500 MG tablet, Take 500 mg by mouth 2 (Two) Times a Day With Meals., Disp: , Rfl:   •  omeprazole (priLOSEC) 20 MG capsule, Take 20 mg by mouth Daily., Disp: , Rfl:   •  TRESIBA FLEXTOUCH 200 UNIT/ML solution pen-injector, 50 Units Daily., Disp: , Rfl: 0  •  furosemide (LASIX) 20 MG tablet, Take 1 tablet by mouth 2 (Two) Times a Day., Disp: 60 tablet, Rfl: 11    Allergies   Allergen Reactions   • Spironolactone Diarrhea     He was not able to tolerate the spironolactone secondary to diarrhea.   • Iodine Rash        reports that he quit smoking about 24 years ago. He does not have any smokeless tobacco history on file.    Family History   Problem Relation Age of Onset   • Heart attack Father        Review of Systems:  Positive for occasional dyspnea with exertion/\"smothering\"  Negative for exertional chest pain, orthopnea, PND, lower extremity edema, palpitations, lightheadedness, syncope.   Review of Systems  All other systems reviewed and are negative.    Objective:   Blood pressure 146/95, pulse 68, height 177.8 cm (70\"), weight 116 kg (255 lb).  Physical Exam  CONSTITUTIONAL: No acute distress, normal affect  RESPIRATORY: Normal effort. Clear to auscultation bilaterally without wheezing or rales  CARDIOVASCULAR: Carotids with normal upstrokes without bruits.  Regular rate and rhythm with normal S1 and S2. Without murmur, gallop or rub.  PERIPHERAL VASCULAR: Normal radial pulses bilaterally, normal posterior tibial pulses bilaterally. There is no lower extremity edema bilaterally.      Labs:  Lab Results   Component Value Date    ALT 38 10/19/2016    AST 28 10/19/2016     Lab Results   Component Value Date    TRIG 257 (H) 07/10/2015    HDL 26 (L) " 07/10/2015    CREATININE 0.90 10/19/2016       Diagnostic Data:    Procedures  TTE 3/2015 results reviewed  Conclusions  1. The estimated ejection fraction is 50-55%.    2. Post surgical hypokinesis of the interventricular septum is observed  consistent with coronary artery bypass.    3. Abnormal left ventricular diastolic filling is observed, consistent  with impaired relaxation.    4. The tricuspid valve leaflets are normal.    5. The tricuspid valve leaflets are not thickened.  6. Tricuspid valve leaflet mobility appears normal.    Assessment and Plan:   Gen was seen today for hypertension, hyperlipidemia and coronary artery disease.    Diagnoses and all orders for this visit:    Coronary artery disease involving native coronary artery of native heart without angina pectoris  -CCS class 2, NYHA II-III symptoms  -Continue current medications including aspirin, carvedilol, atorvastatin  -[facility], left radial approach  -Repeat TTE at time of C    Chronic diastolic heart failure  -Furosemide 20mg daily for now  -Repeat K+ at time of [facility]  -Can also consider switching back to PRN     Essential hypertension  -Blood pressure is elevated today.    -Increased carvedilol to 12.5 mg twice a day.    Mixed hyperlipidemia  -Repeat FLP, LFTs at time of C    - Of note patient will be traveling for several months this summer starting in July. [place] trip out [place]    - No Follow-up on file.  Follow-up will be scheduled after heart catheter    Hari Vargas MD, MSc, [facility]  Interventional Cardiology  [facility] at [facility]

## 2018-05-09 NOTE — INTERVAL H&P NOTE
H&P reviewed. The patient was examined and there are no changes to the H&P.    The patient continues to have intermittent chest heaviness and dyspnea. His Cr is elevated to 1.4 today, fluid protocol is initiated. Bilateral Vin's test acceptable. The risks, benefits, and alternatives of the procedure have been reviewed and the patient wishes to proceed.     Daneyll Jewell, APRN   05/09/18

## 2018-05-10 ENCOUNTER — TELEPHONE (OUTPATIENT)
Dept: CARDIOLOGY | Facility: CLINIC | Age: 63
End: 2018-05-10

## 2018-05-10 NOTE — TELEPHONE ENCOUNTER
"Wife called to request appt be made with Pulmonary MD like Dr. Vargas suggested could do r/t patient \"still not breathing well\".    "

## 2018-05-11 DIAGNOSIS — R06.02 SOB (SHORTNESS OF BREATH): Primary | ICD-10-CM

## 2018-05-16 ENCOUNTER — TELEPHONE (OUTPATIENT)
Dept: CARDIOLOGY | Facility: CLINIC | Age: 63
End: 2018-05-16

## 2018-05-16 NOTE — TELEPHONE ENCOUNTER
----- Message from Hari Vargas MD sent at 5/9/2018  3:10 PM EDT -----  Radha,     Can you check in on Mr Brian next week to tell him how his repeat BMP was (creatinine). If elevated, he should revisit nephrology (has seen on before).    Also, need to check how he's breathing. If still really short of breath, needs PFTs.    Lastly, I asked them to follow his BP. If averages above 140/90mmHg, start amlodipine 5mg daily    Thanks,

## 2018-05-16 NOTE — TELEPHONE ENCOUNTER
I spoke with the patient's wife who states that he had his labs drawn today, which have to be sent off, and that they should be here within the next few days.  I advised that I will call back once these have come in.

## 2018-05-22 NOTE — TELEPHONE ENCOUNTER
Results of recent lab work reviewed with the patient. He states that he is breathing and feeling well at this time, but has not kept a log of his BP.  I asked that he please do this for about a week and call me with the readings.  He verbalized understanding and agreement at this time.

## 2018-06-05 ENCOUNTER — TELEPHONE (OUTPATIENT)
Dept: PULMONOLOGY | Facility: CLINIC | Age: 63
End: 2018-06-05

## 2018-06-05 NOTE — TELEPHONE ENCOUNTER
I called to speak with pt he was not available per wife. I let her know Dr Arana's office sent a referral for Mr Brian. Maira, states they have already scheduled with [facility] and they want to keep the appointment with there office.

## 2018-08-15 DIAGNOSIS — Z00.00 HEALTHCARE MAINTENANCE: ICD-10-CM

## 2018-08-16 RX ORDER — ATORVASTATIN CALCIUM 80 MG/1
TABLET, FILM COATED ORAL
Qty: 90 TABLET | Refills: 3 | Status: SHIPPED | OUTPATIENT
Start: 2018-08-16 | End: 2019-07-30 | Stop reason: SDUPTHER

## 2018-10-01 ENCOUNTER — TELEPHONE (OUTPATIENT)
Dept: CARDIOLOGY | Facility: CLINIC | Age: 63
End: 2018-10-01

## 2018-10-01 NOTE — TELEPHONE ENCOUNTER
The patient's spouse called stating that she would like a return call with no other details left.  I left a return vm requesting that she call back so that I can assist her.  Will await return call.

## 2018-10-12 NOTE — PROGRESS NOTES
[facility] AT [address], [address]  [place], [place], [address] [phone]  [facility]           OUTPATIENT CLINIC FOLLOW UP NOTE    Patient Care Team:  Patient Care Team:  Wendy Allen APRN as PCP - General  Janet, Eliazar ELKINS MD as PCP - Claims Attributed    Subjective:   Reason for consultation:   Chief Complaint   Patient presents with   • Coronary Artery Disease       HPI:    Gen Torres is a 63 y.o. male.  Coronary Artery Disease   Risk factors include hyperlipidemia.   Hypertension     Hyperlipidemia       The patient has a history of CAD status post 5 vessel bypass and an even more remote stent (last [facility] 2015 with patient SVG to PDA, SVG to OM, LIMA to LAD and diag), hypertension, hyperlipidemia, diabetes.  He presents today for follow-up.      Since the patient underwent cardiac catheterization he reports doing well. He denies any chest pain or shortness of breath.  He denies taking any additional Lasix doses. He denies any bleeding events or issues with his medications.     PFSH:  Patient Active Problem List   Diagnosis   • Coronary artery disease involving native coronary artery of native heart without angina pectoris   • Obesity   • Diabetes mellitus (CMS/HCC)   • Mixed hyperlipidemia   • Essential hypertension   • KAYLEIGH on CPAP   • ED (erectile dysfunction)   • Chronic diastolic heart failure (CMS/HCC)         Current Outpatient Prescriptions:   •  aspirin 81 MG tablet, Take 1 tablet by mouth Daily., Disp: 90 tablet, Rfl: 3  •  atorvastatin (LIPITOR) 80 MG tablet, take 1 tablet by mouth once daily, Disp: 90 tablet, Rfl: 3  •  B-D UF III MINI PEN NEEDLES 31G X 5 MM misc, See Admin Instructions., Disp: , Rfl: 0  •  BD PEN NEEDLE FANNIE U/F 32G X 4 MM misc, Daily., Disp: , Rfl: 0  •  carvedilol (COREG) 12.5 MG tablet, Take 1 tablet by mouth 2 (Two) Times a Day With Meals., Disp: 60 tablet, Rfl: 11  •  clopidogrel (PLAVIX) 75 MG tablet, Take 1 tablet  "by mouth Daily., Disp: 30 tablet, Rfl: 11  •  DULoxetine (CYMBALTA) 60 MG capsule, Daily., Disp: , Rfl: 0  •  fexofenadine (ALLEGRA) 180 MG tablet, Daily., Disp: , Rfl: 0  •  furosemide (LASIX) 20 MG tablet, Take 1 tablet by mouth Daily As Needed (Swelling)., Disp: 60 tablet, Rfl: 11  •  JARDIANCE 10 MG tablet, Take 1 tablet by mouth Daily., Disp: , Rfl: 0  •  losartan (COZAAR) 100 MG tablet, Take 1 tablet by mouth Daily., Disp: 90 tablet, Rfl: 3  •  omeprazole (priLOSEC) 20 MG capsule, Take 20 mg by mouth Daily., Disp: , Rfl:   •  TRESIBA FLEXTOUCH 200 UNIT/ML solution pen-injector, 50 Units Daily., Disp: , Rfl: 0  No current facility-administered medications for this visit.     Allergies   Allergen Reactions   • Iodine Itching and Rash   • Spironolactone Diarrhea     He was not able to tolerate the spironolactone secondary to diarrhea.        reports that he quit smoking about 24 years ago. His smoking use included Cigarettes. He has never used smokeless tobacco.    Family History   Problem Relation Age of Onset   • Heart attack Father    • Heart disease Father    • Cancer Father    • Cancer Mother    • Heart disease Sister    • Diabetes Sister    • Heart disease Brother        Review of Systems:  Negative for exertional chest pain, dyspnea with exertion, orthopnea, PND, lower extremity edema, palpitations, lightheadedness, syncope.   Review of Systems      Objective:   Blood pressure 118/70, pulse 55, height 177.8 cm (70\"), weight 110 kg (243 lb), SpO2 95 %.  Physical Exam  CONSTITUTIONAL: No acute distress, normal affect  RESPIRATORY: Normal effort. Clear to auscultation bilaterally without wheezing or rales  CARDIOVASCULAR: Regular rate and rhythm with normal S1 and S2. Without murmur, gallop or rub.  PERIPHERAL VASCULAR: Normal radial pulses bilaterally, normal posterior tibial pulses bilaterally. There is no lower extremity edema bilaterally.      Labs:  Lab Results   Component Value Date    ALT 31 05/08/2018 "    AST 21 05/08/2018     Lab Results   Component Value Date    CHOL 160 05/09/2018    TRIG 214 (H) 05/09/2018    HDL 28 (L) 05/09/2018    CREATININE 1.40 (H) 05/08/2018       Diagnostic Data:    Procedures    [facility] 5/2018  · There was severe multivessel coronary artery disease involving a 80% diffuse proximal LAD stenosis, 100%  of the left circumflex artery, 100%  of the right coronary artery.  There is a patent sequential LIMA to diagonal branch and LAD, patent sequential SVG to a diagonal branch and to a large OM, and a patent SVG to the RPDA.  · There was an 80% discrete stenosis just distal to the SVG to OM anastomosis that was found to be functionally significant with an iFR ratio 0.87.  This lesion is now status post intervention with a Xience Alpine 2.75 x 12 mm drug-eluting stent.    TTE 5/2018  · Left ventricular systolic function is normal. Estimated EF = 55%.  · The following left ventricular wall segments are hypokinetic: mid inferolateral and mid inferior.  · Left ventricular diastolic dysfunction (grade I a) consistent with impaired relaxation.  · Left atrial cavity size is mildly dilated.  · Mild mitral valve regurgitation is present    TTE 3/2015  Conclusions  1. The estimated ejection fraction is 50-55%.    2. Post surgical hypokinesis of the interventricular septum is observed  consistent with coronary artery bypass.    3. Abnormal left ventricular diastolic filling is observed, consistent  with impaired relaxation.    4. The tricuspid valve leaflets are normal.    5. The tricuspid valve leaflets are not thickened.  6. Tricuspid valve leaflet mobility appears normal.    Assessment and Plan:   Gen was seen today for hypertension, hyperlipidemia and coronary artery disease.    Diagnoses and all orders for this visit:    Coronary artery disease involving native coronary artery of native heart without angina pectoris  Mixed hyperlipidemia  -CCS class 0  -Continue current medications including  DAPT, carvedilol, atorvastatin  -Will obtain recent lab work from PCP.  -If LDL remains elevated > 100 despite being on atorvastatin 80 mg po daily would switch to Crestor 40 mg po daily.  -Continue atorvastatin.     Chronic diastolic heart failure  -Continue Lasix.  -EF 55% 5/2018  -Can also consider switching back to PRN     Essential hypertension  -At goal, continue current medications.         - Return in about 6 months (around 4/15/2019).      GEOFF Tamayo obtained past medical, family history, social history, review of systems and functioned as a scribe for the remainder of the dictation for Dr. Vargas.     I, Hari Vargas MD, personally performed the services as scribed by the above named individual. I have made any necessary edits and it is both accurate and complete.     Hari Vargas MD, MSc, [facility]  Interventional Cardiology  [facility] at [facility]

## 2018-10-15 ENCOUNTER — OFFICE VISIT (OUTPATIENT)
Dept: ORTHOPEDIC SURGERY | Facility: CLINIC | Age: 63
End: 2018-10-15

## 2018-10-15 ENCOUNTER — OFFICE VISIT (OUTPATIENT)
Dept: CARDIOLOGY | Facility: CLINIC | Age: 63
End: 2018-10-15

## 2018-10-15 VITALS
SYSTOLIC BLOOD PRESSURE: 118 MMHG | OXYGEN SATURATION: 95 % | HEIGHT: 70 IN | HEART RATE: 55 BPM | WEIGHT: 243 LBS | DIASTOLIC BLOOD PRESSURE: 70 MMHG | BODY MASS INDEX: 34.79 KG/M2

## 2018-10-15 VITALS — BODY MASS INDEX: 34.09 KG/M2 | OXYGEN SATURATION: 98 % | HEIGHT: 70 IN | WEIGHT: 238.1 LBS | HEART RATE: 54 BPM

## 2018-10-15 DIAGNOSIS — I25.10 CORONARY ARTERY DISEASE INVOLVING NATIVE CORONARY ARTERY OF NATIVE HEART WITHOUT ANGINA PECTORIS: Primary | ICD-10-CM

## 2018-10-15 DIAGNOSIS — I10 ESSENTIAL HYPERTENSION: ICD-10-CM

## 2018-10-15 DIAGNOSIS — M19.011 ARTHRITIS OF RIGHT ACROMIOCLAVICULAR JOINT: ICD-10-CM

## 2018-10-15 DIAGNOSIS — E78.2 MIXED HYPERLIPIDEMIA: ICD-10-CM

## 2018-10-15 DIAGNOSIS — G89.29 CHRONIC RIGHT SHOULDER PAIN: Primary | ICD-10-CM

## 2018-10-15 DIAGNOSIS — I50.32 CHRONIC DIASTOLIC HEART FAILURE (HCC): ICD-10-CM

## 2018-10-15 DIAGNOSIS — M75.31 CALCIFIC TENDONITIS OF RIGHT SHOULDER: ICD-10-CM

## 2018-10-15 DIAGNOSIS — M75.81 ROTATOR CUFF TENDONITIS, RIGHT: ICD-10-CM

## 2018-10-15 DIAGNOSIS — M25.511 CHRONIC RIGHT SHOULDER PAIN: Primary | ICD-10-CM

## 2018-10-15 PROBLEM — I25.118 CORONARY ARTERY DISEASE OF NATIVE ARTERY OF NATIVE HEART WITH STABLE ANGINA PECTORIS: Status: RESOLVED | Noted: 2018-05-03 | Resolved: 2018-10-15

## 2018-10-15 PROCEDURE — 20610 DRAIN/INJ JOINT/BURSA W/O US: CPT | Performed by: PHYSICIAN ASSISTANT

## 2018-10-15 PROCEDURE — 99213 OFFICE O/P EST LOW 20 MIN: CPT | Performed by: INTERNAL MEDICINE

## 2018-10-15 PROCEDURE — 99204 OFFICE O/P NEW MOD 45 MIN: CPT | Performed by: PHYSICIAN ASSISTANT

## 2018-10-15 RX ORDER — EMPAGLIFLOZIN 10 MG/1
1 TABLET, FILM COATED ORAL DAILY
Refills: 0 | COMMUNITY
Start: 2018-10-01 | End: 2019-04-22

## 2018-10-15 RX ORDER — FEXOFENADINE HCL 180 MG/1
TABLET ORAL DAILY
Refills: 0 | COMMUNITY
Start: 2018-10-02 | End: 2019-09-30

## 2018-10-15 RX ORDER — TRIAMCINOLONE ACETONIDE 40 MG/ML
80 INJECTION, SUSPENSION INTRA-ARTICULAR; INTRAMUSCULAR
Status: COMPLETED | OUTPATIENT
Start: 2018-10-15 | End: 2018-10-15

## 2018-10-15 RX ORDER — PEN NEEDLE, DIABETIC 32GX 5/32"
NEEDLE, DISPOSABLE MISCELLANEOUS DAILY
Refills: 0 | COMMUNITY
Start: 2018-10-01 | End: 2020-08-30

## 2018-10-15 RX ORDER — FLURBIPROFEN SODIUM 0.3 MG/ML
SOLUTION/ DROPS OPHTHALMIC SEE ADMIN INSTRUCTIONS
Refills: 0 | COMMUNITY
Start: 2018-07-31 | End: 2020-08-30

## 2018-10-15 RX ORDER — LIDOCAINE HYDROCHLORIDE 10 MG/ML
3 INJECTION, SOLUTION INFILTRATION; PERINEURAL
Status: COMPLETED | OUTPATIENT
Start: 2018-10-15 | End: 2018-10-15

## 2018-10-15 RX ORDER — DULOXETIN HYDROCHLORIDE 60 MG/1
60 CAPSULE, DELAYED RELEASE ORAL DAILY
Refills: 0 | COMMUNITY
Start: 2018-09-20 | End: 2022-12-12

## 2018-10-15 RX ORDER — BUPIVACAINE HYDROCHLORIDE 2.5 MG/ML
3 INJECTION, SOLUTION INFILTRATION; PERINEURAL
Status: COMPLETED | OUTPATIENT
Start: 2018-10-15 | End: 2018-10-15

## 2018-10-15 RX ADMIN — TRIAMCINOLONE ACETONIDE 80 MG: 40 INJECTION, SUSPENSION INTRA-ARTICULAR; INTRAMUSCULAR at 09:45

## 2018-10-15 RX ADMIN — LIDOCAINE HYDROCHLORIDE 3 ML: 10 INJECTION, SOLUTION INFILTRATION; PERINEURAL at 09:45

## 2018-10-15 RX ADMIN — BUPIVACAINE HYDROCHLORIDE 3 ML: 2.5 INJECTION, SOLUTION INFILTRATION; PERINEURAL at 09:45

## 2018-10-15 NOTE — PROGRESS NOTES
[facility] Note    Subjective     Pain of the Right Shoulder      HPI    Gen Torres is a 63 y.o. male.  Right-hand-dominant.  Patient presents to orthopedic clinic for evaluation of right shoulder pain.  No history of injury or trauma.  States began about 1 year ago insidious onset.  At this time he states he is unable to reach behind, overhead, sleeping on the right side or perform heavy  lifting activities secondary to the pain.  No prior history of treatments.  He denies any radiculopathy or paresthesias.  This time he denies any fever, chills, night sweats or other constitutional symptoms.  No reported radiculopathy or paresthesias.    Past Medical History:   Diagnosis Date   • Absent left knee reflex     His wife showed a concern about absent knee reflexes and according to the patient this could have been attributed to his severe back related problem.    • CAD (coronary artery disease)     CABG   • CHF (congestive heart failure) (CMS/[facility])    • Diabetes mellitus (CMS/[facility])    • ED (erectile dysfunction)     Further history revealed that he also has erectile dysfunction which he attributed may be due to s/p prostatic cancer.   • GERD (gastroesophageal reflux disease)    • Hyperlipidemia    • Hypertension    • Lower extremity edema      This seems to be fairly resolved at this point.    • Obesity    • KAYLEIGH on CPAP    • Prostate cancer (CMS/[facility])    • SOB (shortness of breath)       During his first visit, the patient mentioned having very occasional shortness of air which he correlates with gaining weight.  This seems to be fairly resolved at this point.       Past Surgical History:   Procedure Laterality Date   • CARDIAC CATHETERIZATION     • CARDIAC CATHETERIZATION N/A 5/9/2018    Procedure: Left Heart Cath;  Surgeon: Hari Vargas MD;  Location: [facility] INVASIVE LOCATION;  Service: Cardiovascular   • CARDIAC CATHETERIZATION N/A 5/9/2018    Procedure: Coronary angiography;  Surgeon:  Hari Vargas MD;  [facility];  Service: Cardiovascular   • CARDIAC SURGERY     • COLONOSCOPY     • CORONARY ARTERY BYPASS GRAFT      x5   • PROSTATECTOMY      Status post radical prostatectomy for carcinoma.      Family History   Problem Relation Age of Onset   • Heart attack Father      Social History     Social History   • Marital status:      Spouse name: N/A   • Number of children: N/A   • Years of education: N/A     Occupational History   • Not on file.     Social History Main Topics   • Smoking status: Former Smoker     Quit date: 1994   • Smokeless tobacco: Never Used   • Alcohol use No   • Drug use: No   • Sexual activity: Defer     Other Topics Concern   • Not on file     Social History Narrative   • No narrative on file      Current Outpatient Prescriptions on File Prior to Visit   Medication Sig Dispense Refill   • aspirin 81 MG tablet Take 1 tablet by mouth Daily. 90 tablet 3   • atorvastatin (LIPITOR) 80 MG tablet take 1 tablet by mouth once daily 90 tablet 3   • carvedilol (COREG) 12.5 MG tablet Take 1 tablet by mouth 2 (Two) Times a Day With Meals. 60 tablet 11   • cimetidine (TAGAMET) 400 MG tablet Take 1 tablet by mouth Every Morning. One tablet am prior to cath; one tablet am of cath 2 tablet 0   • clopidogrel (PLAVIX) 75 MG tablet Take 1 tablet by mouth Daily. 30 tablet 11   • furosemide (LASIX) 20 MG tablet Take 1 tablet by mouth Daily As Needed (Swelling). 60 tablet 11   • losartan (COZAAR) 100 MG tablet Take 1 tablet by mouth Daily. 90 tablet 3   • omeprazole (priLOSEC) 20 MG capsule Take 20 mg by mouth Daily.     • TRESIBA FLEXTOUCH 200 UNIT/ML solution pen-injector 50 Units Daily.  0   • metFORMIN (GLUCOPHAGE) 500 MG tablet Take 500 mg by mouth 2 (Two) Times a Day With Meals.     • [DISCONTINUED] diphenhydrAMINE (BENADRYL) 50 MG tablet Take 1 tablet by mouth Every Morning. Take one tablet am prior to cath; one tab am of cath 2 tablet 0   • [DISCONTINUED]  "DULoxetine (CYMBALTA) 30 MG capsule Take 60 mg by mouth Daily.     • [DISCONTINUED] predniSONE (DELTASONE) 20 MG tablet Take 2 tablets by mouth Every Morning. 2 tablets am prior to cath; 2 tablets am of cath 4 tablet 0     No current facility-administered medications on file prior to visit.       Allergies   Allergen Reactions   • Iodine Itching and Rash   • Spironolactone Diarrhea     He was not able to tolerate the spironolactone secondary to diarrhea.        The following portions of the patient's history were reviewed and updated as appropriate: allergies, current medications, past family history, past medical history, past social history, past surgical history and problem list.    Review of Systems   Constitutional: Positive for activity change and fatigue.   Eyes: Negative.    Respiratory: Positive for apnea.    Cardiovascular: Negative.    Gastrointestinal: Negative.    Endocrine: Negative.    Genitourinary: Negative.    Musculoskeletal: Positive for arthralgias.   Skin: Negative.    Allergic/Immunologic: Positive for environmental allergies.   Neurological: Positive for headaches.   Hematological: Negative.    Psychiatric/Behavioral: Negative.         Objective      Physical Exam  Pulse 54   Ht 177.8 cm (70\")   Wt 108 kg (238 lb 1.6 oz)   SpO2 98%   BMI 34.16 kg/m²     Body mass index is 34.16 kg/m².    General  Mental Status - alert  General Appearance - cooperative, well groomed, not in acute distress  Orientation - Oriented X3  Build & Nutrition - well developed and well nourished  Posture - normal posture  Gait - normal gait     Integumentary  Global Assessment  Examination of related systems reveals - no lymphadenopathy  Ears:  No abnormality  Nose:  No mucous drainage  General Characteristics  Overall examination of the patient's skin reveals - no rashes, no evidence of scars, no suspicious lesions and no bruises.  Color - normal coloration of skin.  Vascular: Brisk capillary refill in all " extremities    Ortho Exam  Musculoskeletal   Upper Extremity   Right Shoulder     Inspection and Palpation:     Tenderness - predominantly lateral shoulder but does note some tenderness to the posterior and anterior aspects    Crepitus - mild    Sensation is normal    Examination reveals no ecchymosis.        Strength and Tone:    Supraspinatus -4/5    External Rotators-4/5    Infraspinatus - 4/5    Subscapularis - 5/5    Deltoid - 5/5     Range of Motion   Left shoulder:    Internal Rotation: ROM - T12    External Rotation: AROM - 80 degrees    Elevation through flexion: AROM - 180 degrees    Right Shoulder: Motion limited secondary to pain    Internal Rotation: ROM - T12    External Rotation: AROM - 65 degrees    Elevation through flexion: AROM - 160 degrees      Instability   Right shoulder    Sulcus sign negative    Apprehension test negative    Pérez relocation test negative    Jerk test negative     Impingement   Right shoulder    Vela-Kory impingement test positive    Neer impingement test positive     Functional Testing   Right shoulder    AC crossover adduction test positive    Abdominal compression test positive    Lift-off sign positive    Speed's test negative    Guy's test equivocal    Hornblower's sign negative       Imaging/Studies  Imaging Results (last 24 hours)     Procedure Component Value Units Date/Time    XR Shoulder 2+ View Right [[id number]] Resulted:  10/15/18 0933     Updated:  10/15/18 0934    Narrative:       Right Shoulder X-Ray  Indication: Pain  AP, scapular Y, and axillary lateral views    Findings: Calcifications in the rotator cuff tendon consistent with   calcific tendinitis measuring over 2 cm in size.  No fracture  Normal soft tissues  Normal joint spaces    No prior studies were available for comparison.        Ordered plain film imaging of the right shoulder.  Images reviewed with Dr. Chu.  Positive calcification in the rotator cuff tendon consistent with calcific  tendinitis.  No abnormal soft tissue findings.  Joint spaces are preserved positive AC arthrosis.  See chart for official report.    Assessment:  1. Chronic right shoulder pain    2. Calcific tendonitis of right shoulder    3. Rotator cuff tendonitis, right    4. Arthritis of right acromioclavicular joint        Plan:  1. Discussion was had the patient and his wife regarding exam, imaging, assessment and treatment options.  2. He was offered and accepted a subacromial corticosteroid injection to the right shoulder.  Patient was given the injection today.    3. He was sent for physical therapy.    4. Because of patient's past medical history to include CAD, HTN, DM, etc. will leave pain control management up to his PCP.   5. He'll follow-up in 6 weeks for repeat evaluation, sooner if issues arise or symptoms worsen/change.    6. Question and concerns answered.      After discussing the risks of injection, the patient gave consent to proceed.  His right shoulder subacromial space was confirmed as the correct site to be injected with a timeout.  It was then prepped using Hibiclens and injected with a mixture of 3 cc of 1% plain lidocaine, 3 cc 0.25% plain Marcaine and 2 cc of Kenalog (40 mg per mL) without any resistance through the posterior lateral approach, patient in seated position.  Area was cleaned, hemostasis was achieved and a Band-Aid was applied over the injection site.  The patient tolerated procedure well.  I instructed the patient on signs and symptoms of infection.  They should report to the emergency department or return to clinic if any of these develop, for further evaluation and treatment.  Recommended modifying activity for the next 48 hours to include rest, ice, elevation and oral pain medication as needed.  Patient understood that he was receiving a steroid today and that could cause his sugars to increase.  He'll monitor and adjust his medications as needed.        Medical Decision  Making  Management Options : prescription/IM medicine and physical/occupational therapy  Data/Risk: radiology tests    Aurelia Riley PA-C  10/15/18  10:50 AM

## 2018-10-15 NOTE — PROGRESS NOTES
Procedure   Large Joint Arthrocentesis  Date/Time: 10/15/2018 9:45 AM  Consent given by: patient  Site marked: site marked  Timeout: Immediately prior to procedure a time out was called to verify the correct patient, procedure, equipment, support staff and site/side marked as required   Supporting Documentation  Indications: pain   Procedure Details  Location: shoulder - R subacromial bursa  Preparation: Patient was prepped and draped in the usual sterile fashion  Needle size: 22 G  Approach: posterior  Medications administered: 3 mL bupivacaine 0.25 %; 3 mL lidocaine 1 %; 80 mg triamcinolone acetonide 40 MG/ML  Patient tolerance: patient tolerated the procedure well with no immediate complications

## 2018-10-18 ENCOUNTER — TELEPHONE (OUTPATIENT)
Dept: CARDIOLOGY | Facility: CLINIC | Age: 63
End: 2018-10-18

## 2018-11-05 ENCOUNTER — TELEPHONE (OUTPATIENT)
Dept: CARDIOLOGY | Facility: CLINIC | Age: 63
End: 2018-11-05

## 2018-11-05 NOTE — TELEPHONE ENCOUNTER
Results of lab work reviewed with the patient.  Advised per MJS that as his LDL is <100 he may remain on atorvastatin.  All questions and concerns addressed at this time.  Understanding and agreement verbalized.

## 2019-02-20 RX ORDER — CLOPIDOGREL BISULFATE 75 MG/1
75 TABLET ORAL DAILY
Qty: 90 TABLET | Refills: 3 | Status: SHIPPED | OUTPATIENT
Start: 2019-02-20 | End: 2019-09-30

## 2019-02-20 RX ORDER — CARVEDILOL 12.5 MG/1
12.5 TABLET ORAL 2 TIMES DAILY WITH MEALS
Qty: 180 TABLET | Refills: 3 | Status: SHIPPED | OUTPATIENT
Start: 2019-02-20 | End: 2020-02-11

## 2019-04-19 NOTE — PROGRESS NOTES
[facility] AT [address], [address]  [place], [place], [address] [phone]  [facility]           OUTPATIENT CLINIC FOLLOW UP NOTE    Patient Care Team:  Patient Care Team:  Wendy Allen APRN as PCP - General  Eliazar Gonzales MD as PCP - Claims Attributed    Subjective:   Reason for consultation:   Chief Complaint   Patient presents with   • Coronary Artery Disease       HPI:    Gen Torres is a 63 y.o. male.  The patient has a history of CAD status post 5 vessel bypass and an even more remote stent (last [facility] 2015 with patient SVG to PDA, SVG to OM, LIMA to LAD and diag), hypertension, hyperlipidemia, diabetes.  He presents today for follow-up.      Since the patient was last seen he reports that he has been doing well from a cardiac standpoint.  He denies any chest pain, shortness of breath, lower extremity edema.  In the past 6 months he has only taken approximately 2 doses of Lasix.  He denies any significant bleeding events.  His blood pressure has been stable.    PFSH:  Patient Active Problem List   Diagnosis   • Coronary artery disease involving native coronary artery of native heart without angina pectoris   • Obesity   • Diabetes mellitus (CMS/HCC)   • Mixed hyperlipidemia   • Essential hypertension   • KAYLEIGH on CPAP   • ED (erectile dysfunction)   • Chronic diastolic heart failure (CMS/HCC)         Current Outpatient Medications:   •  aspirin 81 MG tablet, Take 1 tablet by mouth Daily., Disp: 90 tablet, Rfl: 3  •  atorvastatin (LIPITOR) 80 MG tablet, take 1 tablet by mouth once daily, Disp: 90 tablet, Rfl: 3  •  B-D UF III MINI PEN NEEDLES 31G X 5 MM misc, See Admin Instructions., Disp: , Rfl: 0  •  BD PEN NEEDLE FANNIE U/F 32G X 4 MM misc, Daily., Disp: , Rfl: 0  •  carvedilol (COREG) 12.5 MG tablet, Take 1 tablet by mouth 2 (Two) Times a Day With Meals., Disp: 180 tablet, Rfl: 3  •  clopidogrel (PLAVIX) 75 MG tablet, Take 1 tablet by mouth  "Daily., Disp: 90 tablet, Rfl: 3  •  DULoxetine (CYMBALTA) 60 MG capsule, Daily., Disp: , Rfl: 0  •  fexofenadine (ALLEGRA) 180 MG tablet, Daily., Disp: , Rfl: 0  •  furosemide (LASIX) 20 MG tablet, Take 1 tablet by mouth Daily As Needed (Swelling)., Disp: 60 tablet, Rfl: 11  •  losartan (COZAAR) 100 MG tablet, Take 1 tablet by mouth Daily., Disp: 90 tablet, Rfl: 3  •  omeprazole (priLOSEC) 20 MG capsule, Take 20 mg by mouth Daily., Disp: , Rfl:   •  TRESIBA FLEXTOUCH 200 UNIT/ML solution pen-injector, 50 Units Daily., Disp: , Rfl: 0    Allergies   Allergen Reactions   • Iodine Itching and Rash   • Spironolactone Diarrhea     He was not able to tolerate the spironolactone secondary to diarrhea.        reports that he quit smoking about 25 years ago. His smoking use included cigarettes. He has never used smokeless tobacco.    Family History   Problem Relation Age of Onset   • Heart attack Father    • Heart disease Father    • Cancer Father    • Cancer Mother    • Heart disease Sister    • Diabetes Sister    • Heart disease Brother        Review of Systems:  Negative for exertional chest pain, dyspnea with exertion, orthopnea, PND, lower extremity edema, palpitations, lightheadedness, syncope.     Objective:   Blood pressure 118/74, pulse 60, height 177.8 cm (70\"), weight 112 kg (246 lb), SpO2 98 %.  CONSTITUTIONAL: No acute distress, normal affect  RESPIRATORY: Normal effort. Clear to auscultation bilaterally without wheezing or rales  CARDIOVASCULAR: Regular rate and rhythm with normal S1 and S2. Without murmur, gallop or rub.  PERIPHERAL VASCULAR: Normal radial pulses bilaterally. There is no peripheral edema bilaterally.    Labs:  Lab Results   Component Value Date    ALT 31 05/08/2018    AST 21 05/08/2018     Lab Results   Component Value Date    CHOL 160 05/09/2018    TRIG 214 (H) 05/09/2018    HDL 28 (L) 05/09/2018    CREATININE 1.40 (H) 05/08/2018       Diagnostic Data:    Procedures    [facility] 5/2018  · There was " severe multivessel coronary artery disease involving a 80% diffuse proximal LAD stenosis, 100%  of the left circumflex artery, 100%  of the right coronary artery.  There is a patent sequential LIMA to diagonal branch and LAD, patent sequential SVG to a diagonal branch and to a large OM, and a patent SVG to the RPDA.  · There was an 80% discrete stenosis just distal to the SVG to OM anastomosis that was found to be functionally significant with an iFR ratio 0.87.  This lesion is now status post intervention with a Xience Alpine 2.75 x 12 mm drug-eluting stent.    TTE 5/2018  · Left ventricular systolic function is normal. Estimated EF = 55%.  · The following left ventricular wall segments are hypokinetic: mid inferolateral and mid inferior.  · Left ventricular diastolic dysfunction (grade I a) consistent with impaired relaxation.  · Left atrial cavity size is mildly dilated.  · Mild mitral valve regurgitation is present    TTE 3/2015  Conclusions  1. The estimated ejection fraction is 50-55%.    2. Post surgical hypokinesis of the interventricular septum is observed  consistent with coronary artery bypass.    3. Abnormal left ventricular diastolic filling is observed, consistent  with impaired relaxation.    4. The tricuspid valve leaflets are normal.    5. The tricuspid valve leaflets are not thickened.  6. Tricuspid valve leaflet mobility appears normal.    Assessment and Plan:   Gen was seen today for hypertension, hyperlipidemia and coronary artery disease.    Diagnoses and all orders for this visit:    Coronary artery disease involving native coronary artery of native heart without angina pectoris  Mixed hyperlipidemia  -CCS class 0  -LDL 73 and 10/2018.  -Continue current medications including DAPT, carvedilol, atorvastatin  -Discontinue Plavix in May after reaching one year of therapy.     Chronic diastolic heart failure  -EF 55% 5/2018  -Continue Lasix as needed.     Essential hypertension  -At goal,  continue current medications.     - Return in about 6 months (around 10/22/2019).      Danyell Jewell, GEOFF  04/22/19 3:58 PM

## 2019-04-22 ENCOUNTER — OFFICE VISIT (OUTPATIENT)
Dept: CARDIOLOGY | Facility: CLINIC | Age: 64
End: 2019-04-22

## 2019-04-22 VITALS
HEIGHT: 70 IN | DIASTOLIC BLOOD PRESSURE: 74 MMHG | OXYGEN SATURATION: 98 % | WEIGHT: 246 LBS | BODY MASS INDEX: 35.22 KG/M2 | HEART RATE: 60 BPM | SYSTOLIC BLOOD PRESSURE: 118 MMHG

## 2019-04-22 DIAGNOSIS — E78.2 MIXED HYPERLIPIDEMIA: ICD-10-CM

## 2019-04-22 DIAGNOSIS — I50.32 CHRONIC DIASTOLIC HEART FAILURE (HCC): ICD-10-CM

## 2019-04-22 DIAGNOSIS — I25.10 CORONARY ARTERY DISEASE INVOLVING NATIVE CORONARY ARTERY OF NATIVE HEART WITHOUT ANGINA PECTORIS: Primary | ICD-10-CM

## 2019-04-22 DIAGNOSIS — I10 ESSENTIAL HYPERTENSION: ICD-10-CM

## 2019-04-22 PROCEDURE — 99213 OFFICE O/P EST LOW 20 MIN: CPT | Performed by: INTERNAL MEDICINE

## 2019-07-30 DIAGNOSIS — Z00.00 HEALTHCARE MAINTENANCE: ICD-10-CM

## 2019-07-30 RX ORDER — ATORVASTATIN CALCIUM 80 MG/1
TABLET, FILM COATED ORAL
Qty: 90 TABLET | Refills: 3 | Status: SHIPPED | OUTPATIENT
Start: 2019-07-30 | End: 2020-07-31

## 2019-09-25 ENCOUNTER — TELEPHONE (OUTPATIENT)
Dept: CARDIOLOGY | Facility: CLINIC | Age: 64
End: 2019-09-25

## 2019-09-25 NOTE — TELEPHONE ENCOUNTER
"Patients wife called to report that despite PRN lasix for several days the patient is reporting feelings of \"smothering\" and is unable to catch his breath at times, worsening with exertion.  Patient has denied any chest pains or abnormal edema. Patient refused appt with HV clinic this week, however, was agreeable to 10:45 appt on Monday, September 30. Pt advised that if symptoms worsen before appt, to go to ER to be evaluated. Pt agreeable to plan.   "

## 2019-09-30 ENCOUNTER — OFFICE VISIT (OUTPATIENT)
Dept: CARDIOLOGY | Facility: HOSPITAL | Age: 64
End: 2019-09-30

## 2019-09-30 ENCOUNTER — HOSPITAL ENCOUNTER (OUTPATIENT)
Dept: CARDIOLOGY | Facility: HOSPITAL | Age: 64
Discharge: HOME | End: 2019-09-30
Admitting: NURSE PRACTITIONER

## 2019-09-30 VITALS
HEIGHT: 70 IN | SYSTOLIC BLOOD PRESSURE: 150 MMHG | WEIGHT: 252 LBS | DIASTOLIC BLOOD PRESSURE: 93 MMHG | BODY MASS INDEX: 36.08 KG/M2

## 2019-09-30 VITALS
TEMPERATURE: 98 F | OXYGEN SATURATION: 96 % | DIASTOLIC BLOOD PRESSURE: 85 MMHG | BODY MASS INDEX: 36.15 KG/M2 | SYSTOLIC BLOOD PRESSURE: 145 MMHG | WEIGHT: 252.5 LBS | HEART RATE: 60 BPM | HEIGHT: 70 IN | RESPIRATION RATE: 18 BRPM

## 2019-09-30 DIAGNOSIS — I25.10 CORONARY ARTERY DISEASE INVOLVING NATIVE CORONARY ARTERY OF NATIVE HEART WITHOUT ANGINA PECTORIS: ICD-10-CM

## 2019-09-30 DIAGNOSIS — I50.32 CHRONIC DIASTOLIC HEART FAILURE (HCC): ICD-10-CM

## 2019-09-30 DIAGNOSIS — R53.83 FATIGUE, UNSPECIFIED TYPE: ICD-10-CM

## 2019-09-30 DIAGNOSIS — I10 ESSENTIAL HYPERTENSION: ICD-10-CM

## 2019-09-30 DIAGNOSIS — E78.2 MIXED HYPERLIPIDEMIA: ICD-10-CM

## 2019-09-30 DIAGNOSIS — R06.09 DOE (DYSPNEA ON EXERTION): ICD-10-CM

## 2019-09-30 DIAGNOSIS — I50.32 CHRONIC DIASTOLIC HEART FAILURE (HCC): Primary | ICD-10-CM

## 2019-09-30 DIAGNOSIS — I10 ESSENTIAL HYPERTENSION: Primary | ICD-10-CM

## 2019-09-30 LAB
BASOPHILS # BLD AUTO: 0.04 10*3/MM3 (ref 0–0.2)
BASOPHILS NFR BLD AUTO: 0.7 % (ref 0–1.5)
DEPRECATED RDW RBC AUTO: 45.6 FL (ref 37–54)
EOSINOPHIL # BLD AUTO: 0.17 10*3/MM3 (ref 0–0.4)
EOSINOPHIL NFR BLD AUTO: 2.8 % (ref 0.3–6.2)
ERYTHROCYTE [DISTWIDTH] IN BLOOD BY AUTOMATED COUNT: 13.1 % (ref 12.3–15.4)
HCT VFR BLD AUTO: 41.7 % (ref 37.5–51)
HGB BLD-MCNC: 14.7 G/DL (ref 13–17.7)
LYMPHOCYTES # BLD AUTO: 1.49 10*3/MM3 (ref 0.7–3.1)
LYMPHOCYTES NFR BLD AUTO: 24.5 % (ref 19.6–45.3)
MCH RBC QN AUTO: 33.6 PG (ref 26.6–33)
MCHC RBC AUTO-ENTMCNC: 35.3 G/DL (ref 31.5–35.7)
MCV RBC AUTO: 95.4 FL (ref 79–97)
MONOCYTES # BLD AUTO: 0.35 10*3/MM3 (ref 0.1–0.9)
MONOCYTES NFR BLD AUTO: 5.7 % (ref 5–12)
NEUTROPHILS # BLD AUTO: 4.01 10*3/MM3 (ref 1.7–7)
NEUTROPHILS NFR BLD AUTO: 65.8 % (ref 42.7–76)
NT-PROBNP SERPL-MCNC: 146 PG/ML (ref 5–900)
PLATELET # BLD AUTO: 126 10*3/MM3 (ref 140–450)
PMV BLD AUTO: 9.9 FL (ref 6–12)
RBC # BLD AUTO: 4.37 10*6/MM3 (ref 4.14–5.8)
WBC NRBC COR # BLD: 6.09 10*3/MM3 (ref 3.4–10.8)

## 2019-09-30 PROCEDURE — 83880 ASSAY OF NATRIURETIC PEPTIDE: CPT | Performed by: NURSE PRACTITIONER

## 2019-09-30 PROCEDURE — 99214 OFFICE O/P EST MOD 30 MIN: CPT | Performed by: NURSE PRACTITIONER

## 2019-09-30 PROCEDURE — 85025 COMPLETE CBC W/AUTO DIFF WBC: CPT | Performed by: NURSE PRACTITIONER

## 2019-09-30 PROCEDURE — 25010000002 SULFUR HEXAFLUORIDE MICROSPH 60.7-25 MG RECONSTITUTED SUSPENSION: Performed by: NURSE PRACTITIONER

## 2019-09-30 PROCEDURE — 93306 TTE W/DOPPLER COMPLETE: CPT

## 2019-09-30 PROCEDURE — 93306 TTE W/DOPPLER COMPLETE: CPT | Performed by: INTERNAL MEDICINE

## 2019-09-30 RX ORDER — BUTALBITAL, ACETAMINOPHEN AND CAFFEINE 50; 325; 40 MG/1; MG/1; MG/1
1 TABLET ORAL AS NEEDED
Refills: 0 | COMMUNITY
Start: 2019-08-30 | End: 2020-06-22

## 2019-09-30 RX ORDER — TOPIRAMATE 25 MG/1
1 TABLET ORAL DAILY
Refills: 0 | Status: ON HOLD | COMMUNITY
Start: 2019-09-29 | End: 2019-10-22

## 2019-09-30 RX ADMIN — SULFUR HEXAFLUORIDE 2 ML: KIT at 17:00

## 2019-09-30 NOTE — PROGRESS NOTES
[facility]  [facility]      Encounter Date:09/30/2019     Gen [address] [facility] [id number]  [unfilled]    1955    Wendy Allen APRN    Gen Torres is a 64 y.o. male.      Subjective:     Chief Complaint:  Shortness of Breath and Establish Care       HPI patient presents to the office today for ongoing evaluation of his dyspnea. Dyspnea has been present for the past two weeks. Dyspnea is present at rest and with exertion. He denies chest pain. He notes worsening fatigue over the last month as well. Denies dizziness, presyncope, palpitations. Notes that he has been taking lasix with no improvement in symptoms. Denies pedal edema.      Patient Active Problem List   Diagnosis   • Coronary artery disease involving native coronary artery of native heart without angina pectoris   • Obesity   • Diabetes mellitus (CMS/HCC)   • Mixed hyperlipidemia   • Essential hypertension   • KAYLEIGH on CPAP   • ED (erectile dysfunction)   • Chronic diastolic heart failure (CMS/HCC)       Past Medical History:   Diagnosis Date   • Absent left knee reflex     His wife showed a concern about absent knee reflexes and according to the patient this could have been attributed to his severe back related problem.    • CAD (coronary artery disease)     CABG   • CHF (congestive heart failure) (CMS/HCC)    • Diabetes mellitus (CMS/HCC)    • ED (erectile dysfunction)     Further history revealed that he also has erectile dysfunction which he attributed may be due to s/p prostatic cancer.   • GERD (gastroesophageal reflux disease)    • Hyperlipidemia    • Hypertension    • Lower extremity edema      This seems to be fairly resolved at this point.    • Obesity    • KAYLEIGH on CPAP    • Prostate cancer (CMS/HCC)    • SOB (shortness of breath)       During his first visit, the patient mentioned having very occasional shortness of air which he correlates with gaining weight.  This seems to be fairly  resolved at this point.        Past Surgical History:   Procedure Laterality Date   • CARDIAC CATHETERIZATION     • CARDIAC CATHETERIZATION N/A 2018    Procedure: Left Heart Cath;  Surgeon: Hari Vargas MD;  Location:  [facility] CATH INVASIVE [facility];  Service: Cardiovascular   • CARDIAC CATHETERIZATION N/A 2018    Procedure: Coronary angiography;  Surgeon: Hari Vargas MD;  Location:  [facility] CATH INVASIVE LOCATION;  Service: Cardiovascular   • CARDIAC SURGERY     • COLONOSCOPY     • CORONARY ARTERY BYPASS GRAFT      x5   • PROSTATECTOMY      Status post radical prostatectomy for carcinoma.       Family History   Problem Relation Age of Onset   • Heart attack Father    • Heart disease Father    • Cancer Father    • Cancer Mother    • Heart disease Sister    • Diabetes Sister    • Heart disease Brother        Social History     Socioeconomic History   • Marital status:      Spouse name: Not on file   • Number of children: Not on file   • Years of education: Not on file   • Highest education level: Not on file   Tobacco Use   • Smoking status: Former Smoker     Types: Cigarettes     Last attempt to quit:      Years since quittin.7   • Smokeless tobacco: Never Used   Substance and Sexual Activity   • Alcohol use: No   • Drug use: No   • Sexual activity: Defer   Social History Narrative    Caffeine use: 2 servings daily.       Allergies   Allergen Reactions   • Iodine Itching and Rash   • Spironolactone Diarrhea     He was not able to tolerate the spironolactone secondary to diarrhea.         Current Outpatient Medications:   •  aspirin 81 MG tablet, Take 1 tablet by mouth Daily., Disp: 90 tablet, Rfl: 3  •  atorvastatin (LIPITOR) 80 MG tablet, take 1 tablet by mouth once daily, Disp: 90 tablet, Rfl: 3  •  B-D UF III MINI PEN NEEDLES 31G X 5 MM misc, See Admin Instructions., Disp: , Rfl: 0  •  BD PEN NEEDLE FANNIE U/F 32G X 4 MM misc, Daily., Disp: , Rfl: 0  •  carvedilol (COREG) 12.5 MG tablet, Take  1 tablet by mouth 2 (Two) Times a Day With Meals., Disp: 180 tablet, Rfl: 3  •  DULoxetine (CYMBALTA) 60 MG capsule, Daily., Disp: , Rfl: 0  •  furosemide (LASIX) 20 MG tablet, Take 1 tablet by mouth Daily As Needed (Swelling)., Disp: 60 tablet, Rfl: 11  •  losartan (COZAAR) 100 MG tablet, Take 1 tablet by mouth Daily., Disp: 90 tablet, Rfl: 3  •  metFORMIN (GLUCOPHAGE) 500 MG tablet, Take 1 tablet by mouth 2 (Two) Times a Day., Disp: , Rfl: 0  •  omeprazole (priLOSEC) 20 MG capsule, Take 20 mg by mouth Daily., Disp: , Rfl:   •  TRESIBA FLEXTOUCH 200 UNIT/ML solution pen-injector, 50 Units Daily., Disp: , Rfl: 0  •  butalbital-acetaminophen-caffeine (FIORICET, ESGIC) -40 MG per tablet, Take 1 tablet by mouth As Needed., Disp: , Rfl: 0  •  topiramate (TOPAMAX) 25 MG tablet, Take 1 tablet by mouth Daily., Disp: , Rfl: 0    The following portions of the patient's history were reviewed today and updated as appropriate: allergies, current medications, past family history, past medical history, past social history, past surgical history and problem list     Review of Systems   Constitution: Positive for malaise/fatigue. Negative for chills, decreased appetite, diaphoresis, fever, weakness, night sweats, weight gain and weight loss.   HENT: Positive for congestion. Negative for hearing loss, hoarse voice and nosebleeds.    Eyes: Negative for blurred vision, visual disturbance and visual halos.   Cardiovascular: Positive for dyspnea on exertion and leg swelling (mild). Negative for chest pain, claudication, cyanosis, irregular heartbeat, near-syncope, orthopnea, palpitations, paroxysmal nocturnal dyspnea and syncope.   Respiratory: Positive for shortness of breath. Negative for cough, hemoptysis, sleep disturbances due to breathing, snoring, sputum production and wheezing.    Hematologic/Lymphatic: Negative for bleeding problem. Does not bruise/bleed easily.   Skin: Negative for dry skin, itching and rash.  "  Musculoskeletal: Negative for arthritis, falls, joint pain, joint swelling and myalgias.   Gastrointestinal: Negative for bloating, abdominal pain, constipation, diarrhea, flatus, heartburn, hematemesis, hematochezia, melena, nausea and vomiting.   Genitourinary: Negative for dysuria, frequency, hematuria, nocturia and urgency.   Neurological: Negative for excessive daytime sleepiness, dizziness, headaches, light-headedness and loss of balance.   Psychiatric/Behavioral: Negative for depression. The patient does not have insomnia and is not nervous/anxious.        Objective:     Vitals:    09/30/19 1015 09/30/19 1017 09/30/19 1018   BP: 140/81 141/84 145/85   BP Location: Right arm Left arm Left arm   Patient Position: Sitting Sitting Standing   Cuff Size: Adult Adult Adult   Pulse: 55  60   Resp: 18     Temp: 98 °F (36.7 °C)     TempSrc: Temporal     SpO2: 98%  96%   Weight: 115 kg (252 lb 8 oz)     Height: 177.8 cm (70\")         Physical Exam   Constitutional: He is oriented to person, place, and time. He appears well-developed and well-nourished. He is active and cooperative. No distress.   HENT:   Head: Normocephalic and atraumatic.   Mouth/Throat: Oropharynx is clear and moist.   Eyes: Conjunctivae and EOM are normal. Pupils are equal, round, and reactive to light.   Neck: Normal range of motion. Neck supple. No JVD present. No tracheal deviation present. No thyromegaly present.   Cardiovascular: Normal rate, regular rhythm, normal heart sounds and intact distal pulses.   Pulmonary/Chest: Effort normal and breath sounds normal.   Abdominal: Soft. Bowel sounds are normal. He exhibits no distension. There is no tenderness.   Musculoskeletal: Normal range of motion. He exhibits edema (trace edema noted BLEs).   Neurological: He is alert and oriented to person, place, and time.   Skin: Skin is warm, dry and intact.   Psychiatric: He has a normal mood and affect. His behavior is normal.   Nursing note and vitals " reviewed.      Lab and Diagnostic Review:    Echo pending from today  Bmp,cbc, probnp pending from today     Assessment and Plan:   1. Chronic diastolic heart failure (CMS/HCC)  euvolemic  - Basic Metabolic Panel; Future  - proBNP; Future  - Adult Transthoracic Echo Complete W/ Cont if Necessary Per Protocol; Future        2. Coronary artery disease involving native coronary artery of native heart without angina pectoris  Without chest pain  Worsening dyspnea, fatigue over the last month   - Basic Metabolic Panel; Future  - Adult Transthoracic Echo Complete W/ Cont if Necessary Per Protocol; Future  - Basic Metabolic Panel    3. Mixed hyperlipidemia  Statin therapy  - Basic Metabolic Panel; Future  - Basic Metabolic Panel    4. Essential hypertension  Well controlled  HTN Education provided today including signs and symptoms, medication management, daily blood pressure monitoring. Patient encouraged to call the Heart and Valve center with any abnormal readings.   - Basic Metabolic Panel; Future  - Adult Transthoracic Echo Complete W/ Cont if Necessary Per Protocol; Future  - Basic Metabolic Panel    5. SHUKLA (dyspnea on exertion)  - Adult Transthoracic Echo Complete W/ Cont if Necessary Per Protocol; Future  - Basic Metabolic Panel  - proBNP    6. Fatigue, unspecified type    - Adult Transthoracic Echo Complete W/ Cont if Necessary Per Protocol; Future    - CBC Auto Differential          It has been a pleasure to participate in the care of this patient.  Patient was instructed to call the [facility] Center with any questions, concerns, or worsening symptoms.    *Please note that portions of this note were completed with a voice recognition program. Efforts were made to edit the dictations, but occasionally words are mistranscribed.

## 2019-10-01 LAB
BH CV ECHO MEAS - AO MAX PG (FULL): 6.5 MMHG
BH CV ECHO MEAS - AO MAX PG: 11 MMHG
BH CV ECHO MEAS - AO MEAN PG (FULL): 3 MMHG
BH CV ECHO MEAS - AO MEAN PG: 5 MMHG
BH CV ECHO MEAS - AO ROOT AREA (BSA CORRECTED): 1.4
BH CV ECHO MEAS - AO ROOT AREA: 8.6 CM^2
BH CV ECHO MEAS - AO ROOT DIAM: 3.3 CM
BH CV ECHO MEAS - AO V2 MAX: 164 CM/SEC
BH CV ECHO MEAS - AO V2 MEAN: 105 CM/SEC
BH CV ECHO MEAS - AO V2 VTI: 34.2 CM
BH CV ECHO MEAS - ASC AORTA: 3.2 CM
BH CV ECHO MEAS - AVA(I,A): 2.1 CM^2
BH CV ECHO MEAS - AVA(I,D): 2.1 CM^2
BH CV ECHO MEAS - AVA(V,A): 2 CM^2
BH CV ECHO MEAS - AVA(V,D): 2 CM^2
BH CV ECHO MEAS - BSA(HAYCOCK): 2.4 M^2
BH CV ECHO MEAS - BSA: 2.3 M^2
BH CV ECHO MEAS - BZI_BMI: 36.2 KILOGRAMS/M^2
BH CV ECHO MEAS - BZI_METRIC_HEIGHT: 177.8 CM
BH CV ECHO MEAS - BZI_METRIC_WEIGHT: 114.3 KG
BH CV ECHO MEAS - CI(CUBED): 3.3 L/MIN/M^2
BH CV ECHO MEAS - CI(MOD-SP2): 2.1 L/MIN/M^2
BH CV ECHO MEAS - CI(MOD-SP4): 3 L/MIN/M^2
BH CV ECHO MEAS - CI(TEICH): 2.4 L/MIN/M^2
BH CV ECHO MEAS - CO(CUBED): 7.6 L/MIN
BH CV ECHO MEAS - CO(MOD-SP2): 4.9 L/MIN
BH CV ECHO MEAS - CO(MOD-SP4): 6.8 L/MIN
BH CV ECHO MEAS - CO(TEICH): 5.5 L/MIN
BH CV ECHO MEAS - EDV(CUBED): 205.4 ML
BH CV ECHO MEAS - EDV(MOD-SP2): 134 ML
BH CV ECHO MEAS - EDV(MOD-SP4): 204 ML
BH CV ECHO MEAS - EDV(TEICH): 173.2 ML
BH CV ECHO MEAS - EF(CUBED): 63.9 %
BH CV ECHO MEAS - EF(MOD-BP): 60.4 %
BH CV ECHO MEAS - EF(MOD-SP2): 63.1 %
BH CV ECHO MEAS - EF(MOD-SP4): 57.8 %
BH CV ECHO MEAS - EF(TEICH): 54.6 %
BH CV ECHO MEAS - ESV(CUBED): 74.1 ML
BH CV ECHO MEAS - ESV(MOD-SP2): 49.4 ML
BH CV ECHO MEAS - ESV(MOD-SP4): 86 ML
BH CV ECHO MEAS - ESV(TEICH): 78.6 ML
BH CV ECHO MEAS - FS: 28.8 %
BH CV ECHO MEAS - IVS/LVPW: 0.93
BH CV ECHO MEAS - IVSD: 1.3 CM
BH CV ECHO MEAS - LA DIMENSION: 5.1 CM
BH CV ECHO MEAS - LA/AO: 1.5
BH CV ECHO MEAS - LAD MAJOR: 6.2 CM
BH CV ECHO MEAS - LAT PEAK E' VEL: 9.5 CM/SEC
BH CV ECHO MEAS - LATERAL E/E' RATIO: 8.7
BH CV ECHO MEAS - LV DIASTOLIC VOL/BSA (35-75): 88.6 ML/M^2
BH CV ECHO MEAS - LV MASS(C)D: 358.9 GRAMS
BH CV ECHO MEAS - LV MASS(C)DI: 155.9 GRAMS/M^2
BH CV ECHO MEAS - LV MAX PG: 4.5 MMHG
BH CV ECHO MEAS - LV MEAN PG: 2 MMHG
BH CV ECHO MEAS - LV SYSTOLIC VOL/BSA (12-30): 37.3 ML/M^2
BH CV ECHO MEAS - LV V1 MAX: 106 CM/SEC
BH CV ECHO MEAS - LV V1 MEAN: 67.4 CM/SEC
BH CV ECHO MEAS - LV V1 VTI: 22.7 CM
BH CV ECHO MEAS - LVIDD: 5.9 CM
BH CV ECHO MEAS - LVIDS: 4.2 CM
BH CV ECHO MEAS - LVLD AP2: 8.8 CM
BH CV ECHO MEAS - LVLD AP4: 8.5 CM
BH CV ECHO MEAS - LVLS AP2: 7.6 CM
BH CV ECHO MEAS - LVLS AP4: 7.3 CM
BH CV ECHO MEAS - LVOT AREA (M): 3.1 CM^2
BH CV ECHO MEAS - LVOT AREA: 3.1 CM^2
BH CV ECHO MEAS - LVOT DIAM: 2 CM
BH CV ECHO MEAS - LVPWD: 1.4 CM
BH CV ECHO MEAS - MED PEAK E' VEL: 5.4 CM/SEC
BH CV ECHO MEAS - MEDIAL E/E' RATIO: 15.2
BH CV ECHO MEAS - MM HR: 58 BPM
BH CV ECHO MEAS - MM R-R INT: 1 SEC
BH CV ECHO MEAS - MV A MAX VEL: 109 CM/SEC
BH CV ECHO MEAS - MV DEC SLOPE: 367 CM/SEC^2
BH CV ECHO MEAS - MV DEC TIME: 0.23 SEC
BH CV ECHO MEAS - MV E MAX VEL: 82.6 CM/SEC
BH CV ECHO MEAS - MV E/A: 0.76
BH CV ECHO MEAS - MV MAX PG: 5.1 MMHG
BH CV ECHO MEAS - MV MEAN PG: 1 MMHG
BH CV ECHO MEAS - MV V2 MAX: 113 CM/SEC
BH CV ECHO MEAS - MV V2 MEAN: 53.6 CM/SEC
BH CV ECHO MEAS - MV V2 VTI: 36 CM
BH CV ECHO MEAS - MVA(VTI): 2 CM^2
BH CV ECHO MEAS - PA ACC TIME: 0.14 SEC
BH CV ECHO MEAS - PA MAX PG: 6.2 MMHG
BH CV ECHO MEAS - PA PR(ACCEL): 15.6 MMHG
BH CV ECHO MEAS - PA V2 MAX: 124 CM/SEC
BH CV ECHO MEAS - SI(AO): 127 ML/M^2
BH CV ECHO MEAS - SI(CUBED): 57 ML/M^2
BH CV ECHO MEAS - SI(LVOT): 31 ML/M^2
BH CV ECHO MEAS - SI(MOD-SP2): 36.7 ML/M^2
BH CV ECHO MEAS - SI(MOD-SP4): 51.2 ML/M^2
BH CV ECHO MEAS - SI(TEICH): 41.1 ML/M^2
BH CV ECHO MEAS - SV(AO): 292.5 ML
BH CV ECHO MEAS - SV(CUBED): 131.3 ML
BH CV ECHO MEAS - SV(LVOT): 71.3 ML
BH CV ECHO MEAS - SV(MOD-SP2): 84.6 ML
BH CV ECHO MEAS - SV(MOD-SP4): 118 ML
BH CV ECHO MEAS - SV(TEICH): 94.6 ML
BH CV ECHO MEAS - TAPSE (>1.6): 1.87 CM2
BH CV ECHO MEASUREMENTS AVERAGE E/E' RATIO: 11.09
BH CV XLRA - RV BASE: 4.1 CM
BH CV XLRA - RV LENGTH: 6 CM
BH CV XLRA - RV MID: 3.1 CM
BH CV XLRA - TDI S': 10.1 CM/SEC
LEFT ATRIUM VOLUME INDEX: 43 ML/M2

## 2019-10-02 ENCOUNTER — TELEPHONE (OUTPATIENT)
Dept: CARDIOLOGY | Facility: HOSPITAL | Age: 64
End: 2019-10-02

## 2019-10-03 ENCOUNTER — TELEPHONE (OUTPATIENT)
Dept: CARDIOLOGY | Facility: HOSPITAL | Age: 64
End: 2019-10-03

## 2019-10-03 NOTE — TELEPHONE ENCOUNTER
Patient's wife called back about results and states that she has bad service where she lives but is currently at her PCP and will be in town for the next hour if you are able to return call. I explained you were in with patients and patient understood that you may not be able to call in the next hour.

## 2019-10-08 DIAGNOSIS — I25.118 CORONARY ARTERY DISEASE OF NATIVE ARTERY OF NATIVE HEART WITH STABLE ANGINA PECTORIS (HCC): ICD-10-CM

## 2019-10-08 DIAGNOSIS — R06.09 DOE (DYSPNEA ON EXERTION): ICD-10-CM

## 2019-10-08 DIAGNOSIS — I10 ESSENTIAL HYPERTENSION: ICD-10-CM

## 2019-10-08 DIAGNOSIS — R53.83 FATIGUE, UNSPECIFIED TYPE: ICD-10-CM

## 2019-10-08 DIAGNOSIS — I50.32 CHRONIC DIASTOLIC HEART FAILURE (HCC): ICD-10-CM

## 2019-10-08 DIAGNOSIS — I25.10 CORONARY ARTERY DISEASE INVOLVING NATIVE CORONARY ARTERY OF NATIVE HEART WITHOUT ANGINA PECTORIS: Primary | ICD-10-CM

## 2019-10-08 NOTE — PROGRESS NOTES
Reviewed with Dr Vargas:  Patient to have stress test, pa and lateral, cmp and lipid panel.  Discussed recommendations with patient's wife and she verbalized understanding.

## 2019-10-10 ENCOUNTER — TELEPHONE (OUTPATIENT)
Dept: CARDIOLOGY | Facility: HOSPITAL | Age: 64
End: 2019-10-10

## 2019-10-10 ENCOUNTER — HOSPITAL ENCOUNTER (OUTPATIENT)
Dept: CARDIOLOGY | Facility: HOSPITAL | Age: 64
Discharge: HOME | End: 2019-10-10

## 2019-10-10 VITALS — BODY MASS INDEX: 35.98 KG/M2 | WEIGHT: 251.32 LBS | HEIGHT: 70 IN

## 2019-10-10 DIAGNOSIS — I10 ESSENTIAL HYPERTENSION: ICD-10-CM

## 2019-10-10 DIAGNOSIS — I25.118 CORONARY ARTERY DISEASE OF NATIVE ARTERY OF NATIVE HEART WITH STABLE ANGINA PECTORIS (HCC): ICD-10-CM

## 2019-10-10 DIAGNOSIS — E78.2 MIXED HYPERLIPIDEMIA: ICD-10-CM

## 2019-10-10 DIAGNOSIS — R53.83 FATIGUE, UNSPECIFIED TYPE: ICD-10-CM

## 2019-10-10 DIAGNOSIS — R06.09 DOE (DYSPNEA ON EXERTION): ICD-10-CM

## 2019-10-10 DIAGNOSIS — I50.32 CHRONIC DIASTOLIC HEART FAILURE (HCC): ICD-10-CM

## 2019-10-10 DIAGNOSIS — I25.118 CORONARY ARTERY DISEASE OF NATIVE ARTERY OF NATIVE HEART WITH STABLE ANGINA PECTORIS (HCC): Primary | ICD-10-CM

## 2019-10-10 DIAGNOSIS — R94.39 ABNORMAL STRESS TEST: ICD-10-CM

## 2019-10-10 LAB
BH CV STRESS BP STAGE 2: NORMAL
BH CV STRESS BP STAGE 3: NORMAL
BH CV STRESS COMMENTS STAGE 1: NORMAL
BH CV STRESS DOSE REGADENOSON STAGE 1: 0.4
BH CV STRESS DURATION MIN STAGE 1: 1
BH CV STRESS DURATION MIN STAGE 2: 1
BH CV STRESS DURATION MIN STAGE 3: 1
BH CV STRESS DURATION MIN STAGE 4: 1
BH CV STRESS DURATION SEC STAGE 1: 0
BH CV STRESS DURATION SEC STAGE 2: 0
BH CV STRESS DURATION SEC STAGE 3: 0
BH CV STRESS DURATION SEC STAGE 4: 0
BH CV STRESS HR STAGE 1: 68
BH CV STRESS HR STAGE 2: 94
BH CV STRESS HR STAGE 3: 90
BH CV STRESS HR STAGE 4: 89
BH CV STRESS O2 STAGE 1: 95
BH CV STRESS O2 STAGE 2: 97
BH CV STRESS O2 STAGE 3: 97
BH CV STRESS O2 STAGE 4: 95
BH CV STRESS PROTOCOL 1: NORMAL
BH CV STRESS RECOVERY BP: NORMAL MMHG
BH CV STRESS RECOVERY HR: 67 BPM
BH CV STRESS RECOVERY O2: 95 %
BH CV STRESS STAGE 1: 1
BH CV STRESS STAGE 2: 2
BH CV STRESS STAGE 3: 3
BH CV STRESS STAGE 4: 4
LV EF NUC BP: 51 %
MAXIMAL PREDICTED HEART RATE: 157 BPM
PERCENT MAX PREDICTED HR: 59.87 %
STRESS BASELINE BP: NORMAL MMHG
STRESS BASELINE HR: 64 BPM
STRESS O2 SAT REST: 95 %
STRESS PERCENT HR: 70 %
STRESS POST ESTIMATED WORKLOAD: 1 METS
STRESS POST EXERCISE DUR MIN: 4 MIN
STRESS POST EXERCISE DUR SEC: 0 SEC
STRESS POST O2 SAT PEAK: 97 %
STRESS POST PEAK BP: NORMAL MMHG
STRESS POST PEAK HR: 94 BPM
STRESS TARGET HR: 133 BPM

## 2019-10-10 PROCEDURE — A9500 TC99M SESTAMIBI: HCPCS | Performed by: NURSE PRACTITIONER

## 2019-10-10 PROCEDURE — 78452 HT MUSCLE IMAGE SPECT MULT: CPT | Performed by: INTERNAL MEDICINE

## 2019-10-10 PROCEDURE — 0 TECHNETIUM SESTAMIBI: Performed by: NURSE PRACTITIONER

## 2019-10-10 PROCEDURE — 93018 CV STRESS TEST I&R ONLY: CPT | Performed by: INTERNAL MEDICINE

## 2019-10-10 PROCEDURE — 25010000002 REGADENOSON 0.4 MG/5ML SOLUTION: Performed by: NURSE PRACTITIONER

## 2019-10-10 PROCEDURE — 93017 CV STRESS TEST TRACING ONLY: CPT

## 2019-10-10 PROCEDURE — 78452 HT MUSCLE IMAGE SPECT MULT: CPT

## 2019-10-10 RX ORDER — ISOSORBIDE MONONITRATE 30 MG/1
30 TABLET, EXTENDED RELEASE ORAL DAILY
Qty: 30 TABLET | Refills: 11 | Status: ON HOLD | OUTPATIENT
Start: 2019-10-10 | End: 2019-10-22

## 2019-10-10 RX ADMIN — TECHNETIUM TC 99M SESTAMIBI 1 DOSE: 1 INJECTION INTRAVENOUS at 10:20

## 2019-10-10 RX ADMIN — TECHNETIUM TC 99M SESTAMIBI 1 DOSE: 1 INJECTION INTRAVENOUS at 08:40

## 2019-10-10 RX ADMIN — REGADENOSON 0.4 MG: 0.08 INJECTION, SOLUTION INTRAVENOUS at 10:21

## 2019-10-10 NOTE — TELEPHONE ENCOUNTER
Reviewed stress test with patient. Stress was abnormal. Plan for C with Dr Vargas in the near future. Patient to begin imdur 30 mg daily. Patient's wife verbalized understanding.

## 2019-10-11 PROBLEM — R94.39 ABNORMAL STRESS TEST: Status: ACTIVE | Noted: 2019-10-11

## 2019-10-11 PROBLEM — R06.09 DOE (DYSPNEA ON EXERTION): Status: ACTIVE | Noted: 2019-10-11

## 2019-10-11 PROBLEM — I25.118 CORONARY ARTERY DISEASE OF NATIVE ARTERY OF NATIVE HEART WITH STABLE ANGINA PECTORIS: Status: ACTIVE | Noted: 2019-10-11

## 2019-10-11 PROBLEM — R53.83 FATIGUE: Status: ACTIVE | Noted: 2019-10-11

## 2019-10-11 RX ORDER — PREDNISONE 50 MG/1
50 TABLET ORAL DAILY
Qty: 3 TABLET | Refills: 0 | Status: SHIPPED | OUTPATIENT
Start: 2019-10-11 | End: 2019-10-14

## 2019-10-14 ENCOUNTER — PREP FOR SURGERY (OUTPATIENT)
Dept: OTHER | Facility: HOSPITAL | Age: 64
End: 2019-10-14

## 2019-10-14 DIAGNOSIS — R94.39 ABNORMAL STRESS TEST: ICD-10-CM

## 2019-10-14 DIAGNOSIS — I25.119 CORONARY ARTERY DISEASE INVOLVING NATIVE HEART WITH ANGINA PECTORIS, UNSPECIFIED VESSEL OR LESION TYPE (HCC): Primary | ICD-10-CM

## 2019-10-14 RX ORDER — ASPIRIN 81 MG/1
81 TABLET ORAL DAILY
Status: CANCELLED | OUTPATIENT
Start: 2019-10-15

## 2019-10-14 RX ORDER — SODIUM CHLORIDE 0.9 % (FLUSH) 0.9 %
10 SYRINGE (ML) INJECTION AS NEEDED
Status: CANCELLED | OUTPATIENT
Start: 2019-10-14

## 2019-10-14 RX ORDER — SODIUM CHLORIDE 9 MG/ML
330 INJECTION, SOLUTION INTRAVENOUS CONTINUOUS
Status: CANCELLED | OUTPATIENT
Start: 2019-10-14 | End: 2019-10-14

## 2019-10-14 RX ORDER — ONDANSETRON 2 MG/ML
4 INJECTION INTRAMUSCULAR; INTRAVENOUS EVERY 6 HOURS PRN
Status: CANCELLED | OUTPATIENT
Start: 2019-10-14

## 2019-10-14 RX ORDER — SODIUM CHLORIDE 0.9 % (FLUSH) 0.9 %
3 SYRINGE (ML) INJECTION EVERY 12 HOURS SCHEDULED
Status: CANCELLED | OUTPATIENT
Start: 2019-10-14

## 2019-10-14 RX ORDER — LIDOCAINE HYDROCHLORIDE 10 MG/ML
0.1 INJECTION, SOLUTION EPIDURAL; INFILTRATION; INTRACAUDAL; PERINEURAL ONCE AS NEEDED
Status: CANCELLED | OUTPATIENT
Start: 2019-10-14

## 2019-10-14 RX ORDER — DIPHENHYDRAMINE HYDROCHLORIDE 50 MG/ML
50 INJECTION INTRAMUSCULAR; INTRAVENOUS ONCE
Status: CANCELLED | OUTPATIENT
Start: 2019-10-14 | End: 2019-10-14

## 2019-10-14 RX ORDER — ASPIRIN 81 MG/1
324 TABLET, CHEWABLE ORAL ONCE
Status: CANCELLED | OUTPATIENT
Start: 2019-10-14 | End: 2019-10-14

## 2019-10-22 ENCOUNTER — HOSPITAL ENCOUNTER (OUTPATIENT)
Facility: HOSPITAL | Age: 64
Discharge: HOME | End: 2019-10-22
Attending: INTERNAL MEDICINE | Admitting: INTERNAL MEDICINE

## 2019-10-22 ENCOUNTER — APPOINTMENT (OUTPATIENT)
Dept: GENERAL RADIOLOGY | Facility: HOSPITAL | Age: 64
End: 2019-10-22

## 2019-10-22 VITALS
HEIGHT: 70 IN | HEART RATE: 68 BPM | TEMPERATURE: 98.5 F | WEIGHT: 241.18 LBS | DIASTOLIC BLOOD PRESSURE: 81 MMHG | BODY MASS INDEX: 34.53 KG/M2 | OXYGEN SATURATION: 95 % | RESPIRATION RATE: 16 BRPM | SYSTOLIC BLOOD PRESSURE: 142 MMHG

## 2019-10-22 DIAGNOSIS — E78.2 MIXED HYPERLIPIDEMIA: ICD-10-CM

## 2019-10-22 DIAGNOSIS — R94.39 ABNORMAL STRESS TEST: ICD-10-CM

## 2019-10-22 DIAGNOSIS — R53.83 FATIGUE, UNSPECIFIED TYPE: ICD-10-CM

## 2019-10-22 DIAGNOSIS — R06.09 DOE (DYSPNEA ON EXERTION): ICD-10-CM

## 2019-10-22 DIAGNOSIS — I25.119 CORONARY ARTERY DISEASE INVOLVING NATIVE HEART WITH ANGINA PECTORIS, UNSPECIFIED VESSEL OR LESION TYPE (HCC): ICD-10-CM

## 2019-10-22 DIAGNOSIS — I25.118 CORONARY ARTERY DISEASE OF NATIVE ARTERY OF NATIVE HEART WITH STABLE ANGINA PECTORIS (HCC): ICD-10-CM

## 2019-10-22 LAB
ANION GAP SERPL CALCULATED.3IONS-SCNC: 14 MMOL/L (ref 5–15)
BUN BLD-MCNC: 28 MG/DL (ref 8–23)
BUN/CREAT SERPL: 24.6 (ref 7–25)
CALCIUM SPEC-SCNC: 10 MG/DL (ref 8.6–10.5)
CHLORIDE SERPL-SCNC: 101 MMOL/L (ref 98–107)
CHOLEST SERPL-MCNC: 171 MG/DL (ref 0–200)
CO2 SERPL-SCNC: 23 MMOL/L (ref 22–29)
CREAT BLD-MCNC: 1.14 MG/DL (ref 0.76–1.27)
DEPRECATED RDW RBC AUTO: 45.2 FL (ref 37–54)
ERYTHROCYTE [DISTWIDTH] IN BLOOD BY AUTOMATED COUNT: 13.1 % (ref 12.3–15.4)
GFR SERPL CREATININE-BSD FRML MDRD: 65 ML/MIN/1.73
GLUCOSE BLD-MCNC: 200 MG/DL (ref 65–99)
HBA1C MFR BLD: 4.9 % (ref 4.8–5.6)
HCT VFR BLD AUTO: 48.1 % (ref 37.5–51)
HDLC SERPL-MCNC: 31 MG/DL (ref 40–60)
HGB BLD-MCNC: 16.6 G/DL (ref 13–17.7)
LDLC SERPL CALC-MCNC: 117 MG/DL (ref 0–100)
LDLC/HDLC SERPL: 3.77 {RATIO}
MCH RBC QN AUTO: 32.8 PG (ref 26.6–33)
MCHC RBC AUTO-ENTMCNC: 34.5 G/DL (ref 31.5–35.7)
MCV RBC AUTO: 95.1 FL (ref 79–97)
PLATELET # BLD AUTO: 169 10*3/MM3 (ref 140–450)
PMV BLD AUTO: 8.8 FL (ref 6–12)
POTASSIUM BLD-SCNC: 4.4 MMOL/L (ref 3.5–5.2)
RBC # BLD AUTO: 5.06 10*6/MM3 (ref 4.14–5.8)
SODIUM BLD-SCNC: 138 MMOL/L (ref 136–145)
TRIGL SERPL-MCNC: 115 MG/DL (ref 0–150)
VLDLC SERPL-MCNC: 23 MG/DL
WBC NRBC COR # BLD: 6.47 10*3/MM3 (ref 3.4–10.8)

## 2019-10-22 PROCEDURE — 71046 X-RAY EXAM CHEST 2 VIEWS: CPT

## 2019-10-22 PROCEDURE — S0260 H&P FOR SURGERY: HCPCS | Performed by: PHYSICIAN ASSISTANT

## 2019-10-22 PROCEDURE — 93010 ELECTROCARDIOGRAM REPORT: CPT | Performed by: INTERNAL MEDICINE

## 2019-10-22 PROCEDURE — 80048 BASIC METABOLIC PNL TOTAL CA: CPT | Performed by: NURSE PRACTITIONER

## 2019-10-22 PROCEDURE — 83036 HEMOGLOBIN GLYCOSYLATED A1C: CPT | Performed by: NURSE PRACTITIONER

## 2019-10-22 PROCEDURE — 85027 COMPLETE CBC AUTOMATED: CPT | Performed by: NURSE PRACTITIONER

## 2019-10-22 PROCEDURE — C1894 INTRO/SHEATH, NON-LASER: HCPCS | Performed by: INTERNAL MEDICINE

## 2019-10-22 PROCEDURE — 0 IOPAMIDOL PER 1 ML: Performed by: INTERNAL MEDICINE

## 2019-10-22 PROCEDURE — 36415 COLL VENOUS BLD VENIPUNCTURE: CPT

## 2019-10-22 PROCEDURE — 25010000002 MIDAZOLAM PER 1 MG: Performed by: INTERNAL MEDICINE

## 2019-10-22 PROCEDURE — C1769 GUIDE WIRE: HCPCS | Performed by: INTERNAL MEDICINE

## 2019-10-22 PROCEDURE — 93005 ELECTROCARDIOGRAM TRACING: CPT | Performed by: NURSE PRACTITIONER

## 2019-10-22 PROCEDURE — 25010000002 HEPARIN (PORCINE) PER 1000 UNITS: Performed by: INTERNAL MEDICINE

## 2019-10-22 PROCEDURE — 93459 L HRT ART/GRFT ANGIO: CPT | Performed by: INTERNAL MEDICINE

## 2019-10-22 PROCEDURE — 80061 LIPID PANEL: CPT | Performed by: NURSE PRACTITIONER

## 2019-10-22 PROCEDURE — 25010000002 FENTANYL CITRATE (PF) 100 MCG/2ML SOLUTION: Performed by: INTERNAL MEDICINE

## 2019-10-22 RX ORDER — ASPIRIN 81 MG/1
81 TABLET ORAL DAILY
Status: DISCONTINUED | OUTPATIENT
Start: 2019-10-23 | End: 2019-10-22 | Stop reason: HOSPADM

## 2019-10-22 RX ORDER — FAMOTIDINE 10 MG/ML
20 INJECTION, SOLUTION INTRAVENOUS ONCE
Status: COMPLETED | OUTPATIENT
Start: 2019-10-22 | End: 2019-10-22

## 2019-10-22 RX ORDER — DIPHENHYDRAMINE HYDROCHLORIDE 50 MG/ML
50 INJECTION INTRAMUSCULAR; INTRAVENOUS ONCE
Status: DISCONTINUED | OUTPATIENT
Start: 2019-10-22 | End: 2019-10-22 | Stop reason: HOSPADM

## 2019-10-22 RX ORDER — FENTANYL CITRATE 50 UG/ML
INJECTION, SOLUTION INTRAMUSCULAR; INTRAVENOUS AS NEEDED
Status: DISCONTINUED | OUTPATIENT
Start: 2019-10-22 | End: 2019-10-22 | Stop reason: HOSPADM

## 2019-10-22 RX ORDER — ONDANSETRON 2 MG/ML
4 INJECTION INTRAMUSCULAR; INTRAVENOUS EVERY 6 HOURS PRN
Status: DISCONTINUED | OUTPATIENT
Start: 2019-10-22 | End: 2019-10-22 | Stop reason: HOSPADM

## 2019-10-22 RX ORDER — MIDAZOLAM HYDROCHLORIDE 1 MG/ML
INJECTION INTRAMUSCULAR; INTRAVENOUS AS NEEDED
Status: DISCONTINUED | OUTPATIENT
Start: 2019-10-22 | End: 2019-10-22 | Stop reason: HOSPADM

## 2019-10-22 RX ORDER — LIDOCAINE HYDROCHLORIDE 10 MG/ML
0.1 INJECTION, SOLUTION EPIDURAL; INFILTRATION; INTRACAUDAL; PERINEURAL ONCE AS NEEDED
Status: DISCONTINUED | OUTPATIENT
Start: 2019-10-22 | End: 2019-10-22 | Stop reason: HOSPADM

## 2019-10-22 RX ORDER — LIDOCAINE HYDROCHLORIDE 10 MG/ML
INJECTION, SOLUTION EPIDURAL; INFILTRATION; INTRACAUDAL; PERINEURAL AS NEEDED
Status: DISCONTINUED | OUTPATIENT
Start: 2019-10-22 | End: 2019-10-22 | Stop reason: HOSPADM

## 2019-10-22 RX ORDER — SODIUM CHLORIDE 0.9 % (FLUSH) 0.9 %
10 SYRINGE (ML) INJECTION AS NEEDED
Status: DISCONTINUED | OUTPATIENT
Start: 2019-10-22 | End: 2019-10-22 | Stop reason: HOSPADM

## 2019-10-22 RX ORDER — ASPIRIN 81 MG/1
324 TABLET, CHEWABLE ORAL ONCE
Status: COMPLETED | OUTPATIENT
Start: 2019-10-22 | End: 2019-10-22

## 2019-10-22 RX ORDER — SODIUM CHLORIDE 0.9 % (FLUSH) 0.9 %
3 SYRINGE (ML) INJECTION EVERY 12 HOURS SCHEDULED
Status: DISCONTINUED | OUTPATIENT
Start: 2019-10-22 | End: 2019-10-22 | Stop reason: HOSPADM

## 2019-10-22 RX ORDER — SULFAMETHOXAZOLE AND TRIMETHOPRIM 800; 160 MG/1; MG/1
1 TABLET ORAL 2 TIMES DAILY
COMMUNITY
End: 2019-12-09

## 2019-10-22 RX ORDER — SODIUM CHLORIDE 9 MG/ML
330 INJECTION, SOLUTION INTRAVENOUS CONTINUOUS
Status: ACTIVE | OUTPATIENT
Start: 2019-10-22 | End: 2019-10-22

## 2019-10-22 RX ORDER — PREDNISONE 50 MG/1
50 TABLET ORAL TAKE AS DIRECTED
COMMUNITY
End: 2019-10-22 | Stop reason: HOSPADM

## 2019-10-22 RX ADMIN — ASPIRIN 81 MG 324 MG: 81 TABLET ORAL at 09:44

## 2019-10-22 RX ADMIN — SODIUM CHLORIDE 330 ML/HR: 9 INJECTION, SOLUTION INTRAVENOUS at 09:54

## 2019-10-22 RX ADMIN — FAMOTIDINE 20 MG: 10 INJECTION, SOLUTION INTRAVENOUS at 09:56

## 2019-10-22 NOTE — H&P
[facility] at [facility] - Cardiology History & Physical     Gen Trores  1955  2528/1      PCP:  Wendy Allen APRN    Gen Torres is a 64 y.o.  male.    10/22/19    Chief Complaint: Abnormal Stress Test    PROBLEM LIST/PMHx:  1.  Coronary artery disease of native artery of native heart with stable angina pectoris (CMS/HCC)   A.  Remote MI with previous stents, unknown vessel 1995.   B.  Remote CABG x5 approximately 2005, [facility]   C.  Left heart catheterization 7/10/2015 Dr. Shaw: EF 40%, patent SVG- PDA, patent SVG-OM, patent LIMA to LAD.   D.  Recurrent chest pain with left heart catheterization 5/9/2018, Dr. Vargas: Patent GONZALEZ-LAD, patent SVG-PDA.  80% stenosis just after the anastomosis of the SVG to OM.  S/P stenting and IVUS of vessel using [id number] Xience [employer] ANNA.   E.  Echo 5/9/2018 Dr. gutierrez: EF 55%, grade 1A diastolic dysfunction with mild MR and mild dilation of the left atrial cavity.   F.  Progressive fatigue and dyspnea.  Echocardiogram 9/30/2019: EF 50 to 55%, mild to moderate concentric hypertrophy, continued grade 1A diastolic dysfunction.  No significant change.   G.  Nuclear MPS 10/10/2019: EF 51%, occasional PVCs, large size infarct in the lateral wall and inferior wall with mild ivonne-infarct ischemia.  2.  Essential hypertension  3.  Mixed hyperlipidemia  4.  Obesity  5.  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  GERD  7.  History of prostate cancer s/p prostatectomy  8.  Former smoking tobacco abuse, quit 1994  9.  Obstructive Sleep Apnea, CPAP compliant  10.  Seasonal Allergies/Rhinitis              Allergies:  is allergic to iodine and spironolactone.    Medications Prior to Admission   Medication Sig Dispense Refill Last Dose   • aspirin 81 MG tablet Take 1 tablet by mouth Daily. (Patient taking differently: Take 81 mg by mouth Every Night.) 90 tablet 3 10/21/2019 at Unknown time   • atorvastatin  (LIPITOR) 80 MG tablet take 1 tablet by mouth once daily (Patient taking differently: take 1 tablet by mouth once QHS) 90 tablet 3 10/21/2019 at Unknown time   • carvedilol (COREG) 12.5 MG tablet Take 1 tablet by mouth 2 (Two) Times a Day With Meals. 180 tablet 3 10/22/2019 at 0920   • diphenhydrAMINE HCl (BANOPHEN PO) Take 50 mg by mouth Take As Directed. For IV Contrast allergy   10/22/2019 at 0920   • DULoxetine (CYMBALTA) 60 MG capsule Take 60 mg by mouth Daily.  0 10/22/2019 at 0920   • furosemide (LASIX) 20 MG tablet Take 1 tablet by mouth Daily As Needed (Swelling). 60 tablet 11 Past Month at Unknown time   • losartan (COZAAR) 100 MG tablet Take 1 tablet by mouth Daily. 90 tablet 3 10/22/2019 at 0920   • metFORMIN (GLUCOPHAGE) 500 MG tablet Take 1 tablet by mouth 2 (Two) Times a Day.  0 10/21/2019 at 1600   • omeprazole (priLOSEC) 20 MG capsule Take 20 mg by mouth Daily.   10/22/2019 at 0920   • predniSONE (DELTASONE) 50 MG tablet Take 50 mg by mouth Take As Directed. Once daily for 3 doses: 13 hours, 7 hours and 1 hour prior to receiving IV Contrast   10/22/2019 at 0920   • sulfamethoxazole-trimethoprim (BACTRIM DS,SEPTRA DS) 800-160 MG per tablet Take 1 tablet by mouth 2 (Two) Times a Day.   10/22/2019 at 0920   • TRESIBA FLEXTOUCH 200 UNIT/ML solution pen-injector Inject 50 Units under the skin into the appropriate area as directed Every Night.  0 10/21/2019 at Unknown time   • B-D UF III MINI PEN NEEDLES 31G X 5 MM misc See Admin Instructions.  0 Taking   • BD PEN NEEDLE FANNIE U/F 32G X 4 MM misc Daily.  0 Taking   • butalbital-acetaminophen-caffeine (FIORICET, ESGIC) -40 MG per tablet Take 1 tablet by mouth As Needed.  0 More than a month at Unknown time         sodium chloride 330 mL/hr           CARDIAC RISK FACTORS:   advanced age (older than 55 for men, 65 for women), diabetes mellitus, dyslipidemia, family history of premature cardiovascular disease, hypertension, male gender, obesity (BMI >=  30 kg/m2) and sedentary lifestyle.     HPI:  Gen Torres is a 64-year-old  male with past medical history of CAD (remote CABG and stenting), controlled hypertension, mixed dyslipidemia and diabetes mellitus.  He has been followed routinely by both PCP and cardiologist.  Over the last 2 months he has had progressive fatigue and dyspnea.  Echocardiogram performed showed normal LV function without significant valvular disease; no change when compared to previous study.  A nuclear stress test was performed as well which indicated a large defect in the lateral wall and inferior wall.  He is here today to undergo left heart catheterization plus or minus PCI, Dr. Hari Vargas.  The only change since his office visit in the chest pain center is that he was seen by his ENT, Dr. Vera.  He was placed on Bactrim for acute sinusitis and has been scheduled for a home pulse oximetry test.        Social History     Socioeconomic History   • Marital status:      Spouse name: Not on file   • Number of children: Not on file   • Years of education: Not on file   • Highest education level: Not on file   Tobacco Use   • Smoking status: Former Smoker     Types: Cigarettes     Last attempt to quit:      Years since quittin.8   • Smokeless tobacco: Never Used   Substance and Sexual Activity   • Alcohol use: No   • Drug use: No   • Sexual activity: Defer   Social History Narrative    Caffeine use: 2 servings daily.     Family History   Problem Relation Age of Onset   • Heart attack Father    • Heart disease Father    • Cancer Father    • Cancer Mother    • Heart disease Sister    • Diabetes Sister    • Heart disease Brother      Past Surgical History:   Procedure Laterality Date   • CARDIAC CATHETERIZATION     • CARDIAC CATHETERIZATION N/A 2018    Procedure: Left Heart Cath;  Surgeon: Hari Vargas MD;  Location: [facility] CATH INVASIVE LOCATION;  Service: Cardiovascular   • CARDIAC CATHETERIZATION N/A  "5/9/2018    Procedure: Coronary angiography;  Surgeon: Hari Vargas MD;  Location: [facility] INVASIVE LOCATION;  Service: Cardiovascular   • CARDIAC SURGERY     • COLONOSCOPY     • CORONARY ARTERY BYPASS GRAFT      x5   • PROSTATECTOMY      Status post radical prostatectomy for carcinoma.       Review of Systems:  Pertinent positives are listed above and in physical exam.  All others have been reviewed and are negative.     Objective:   Vitals:   height is 177.8 cm (70\") and weight is 109 kg (241 lb 2.9 oz). His tympanic temperature is 98.5 °F (36.9 °C). His blood pressure is 167/95 and his pulse is 67. His respiration is 16 and oxygen saturation is 99%.  No intake or output data in the 24 hours ending 10/22/19 0943      Physical Exam:  General Appearance:    Alert, cooperative, in no acute distress   Head:    Normocephalic, without obvious abnormality, atraumatic   Eyes:            Lids and lashes normal, conjunctivae and sclerae normal, no   icterus, no pallor, corneas clear, PERRLA   Ears:    Ears appear intact with no abnormalities noted   Throat:   No oral lesions, no thrush, oral mucosa moist   Neck:   No adenopathy, supple, trachea midline, no thyromegaly, no     carotid bruit, no JVD   Back:     No kyphosis present, no scoliosis present, no skin lesions,       erythema or scars, no tenderness to percussion or                   palpation,   range of motion normal   Lungs:     Clear to auscultation,respirations regular, even and                   unlabored    Heart:    Regular rhythm and normal rate, normal S1 and S2, no            murmur, no gallop, no rub, no click       Abdomen:     Normal bowel sounds, no masses, no organomegaly, soft        non-tender, non-distended, no guarding, no rebound                 Tenderness.  Obese.       Extremities:   Moves all extremities well,  no cyanosis, no redness, no edema   Pulses:   Pulses palpable and equal bilaterally   Skin:   No bleeding, bruising or rash "   Lymph nodes:   No palpable adenopathy   Neurologic:   Cranial nerves 2 - 12 grossly intact, sensation intact, DTR        present and equal bilaterally          Results Review:  I personally reviewed the patient's clinical results.  Results from last 7 days   Lab Units 10/22/19  0907   WBC 10*3/mm3 6.47   HEMOGLOBIN g/dL 16.6   HEMATOCRIT % 48.1   PLATELETS 10*3/mm3 169     Results from last 7 days   Lab Units 10/22/19  0907   SODIUM mmol/L 138   POTASSIUM mmol/L 4.4   CHLORIDE mmol/L 101   CO2 mmol/L 23.0   BUN mg/dL 28*   CREATININE mg/dL 1.14   CALCIUM mg/dL 10.0   GLUCOSE mg/dL 200*             Results from last 7 days   Lab Units 10/22/19  0907   CHOLESTEROL mg/dL 171   TRIGLYCERIDES mg/dL 115   HDL CHOL mg/dL 31*   LDL CHOL mg/dL 117*           Radiology:  Imaging Results (last 72 hours)     ** No results found for the last 72 hours. **          Tele:  NSR    Assessment and Plan:    1.  Progressive fatigue and dyspnea with abnormal stress test:   -Patient is here today to undergo left heart catheterization with Dr. Hari Vargas.  Risks and benefits have been reviewed and patient wishes to proceed.  Further recognitions based on outcomes.    2.  Coronary artery disease:   -Remote stenting, CABG, and most recent stenting May 2018.   -Continue current medical regimen which includes aspirin and Lipitor.    3.  Essential hypertension:   -Fairly well controlled.  Labs reviewed and renal function is fair   -Continue medications and adjust accordingly.   -Follow-up on labs postprocedure    4.  Mixed hyperlipidemia:   -Lipid panel personally reviewed.   -Continue statin and appropriate diet.   -  Body mass index is 34.61 kg/m².    5.  Obstructive sleep apnea   -Continue CPAP use.   -Discussed that his home pulse oximetry test would likely be abnormal.  We discussed the possibility of COPD as a reason for his shortness of breath if heart catheterization is normal.  If left heart catheterization is normal, patient would  like to be set up with pulmonology.  Here at [facility], her pulmonologist run the sleep studies as well as pulmonary office visits.    6.  NIDDM II   -Hold metformin   -Continue to monitor blood glucose levels.    I discussed the patients findings and my recommendations with the patient, any present family members, and the nursing staff.  Hari Vargas MD saw and examined patient, verified hx and PE, read all radiographic studies, reviewed labs and micro data, and formulated dx, plan for treatment and all medical decision making.      Jennifer Rivera PA-C for Hari Vargas MD  10/22/19 9:43 AM   Electronically signed by JULISA Bocanegra, 10/22/19, 9:46 AM.

## 2019-10-22 NOTE — PLAN OF CARE
Problem: Patient Care Overview  Goal: Plan of Care Review  Outcome: Outcome(s) achieved Date Met: 10/22/19   10/22/19 0675   Coping/Psychosocial   Plan of Care Reviewed With spouse;patient  (verbalized understanding of d/c instructions )   Plan of Care Review   Progress improving   OTHER   Outcome Summary Pt VSS, NSR. Left radial site CDSI covered w/ Gauze & Tegaderm. Pt ambulated post procedure w/o issue or complaint. Chest Xray completed.  Pt and spouse verbalized understanding of d/c instructions.

## 2019-11-04 ENCOUNTER — HOSPITAL ENCOUNTER (OUTPATIENT)
Dept: PULMONOLOGY | Facility: HOSPITAL | Age: 64
Discharge: HOME | End: 2019-11-04
Admitting: INTERNAL MEDICINE

## 2019-11-04 DIAGNOSIS — R06.09 DOE (DYSPNEA ON EXERTION): ICD-10-CM

## 2019-11-04 LAB
ARTERIAL PATENCY WRIST A: ABNORMAL
ATMOSPHERIC PRESS: ABNORMAL MM[HG]
BASE EXCESS BLDA CALC-SCNC: -0.2 MMOL/L (ref 0–2)
BDY SITE: ABNORMAL
BODY TEMPERATURE: 37 C
CO2 BLDA-SCNC: 25.1 MMOL/L (ref 22–33)
COHGB MFR BLD: 1.5 % (ref 0–2)
HCO3 BLDA-SCNC: 24 MMOL/L (ref 20–26)
HCT VFR BLD CALC: 45.7 %
HGB BLDA-MCNC: 14.9 G/DL (ref 13.5–17.5)
HOROWITZ INDEX BLD+IHG-RTO: 21 %
METHGB BLD QL: 0.5 % (ref 0–1.5)
MODALITY: ABNORMAL
NOTE: ABNORMAL
OXYHGB MFR BLDV: 95.6 % (ref 94–99)
PCO2 BLDA: 36.9 MM HG
PCO2 TEMP ADJ BLD: 36.9 MM HG (ref 35–48)
PH BLDA: 7.42 PH UNITS (ref 7.35–7.45)
PH, TEMP CORRECTED: 7.42 PH UNITS
PO2 BLDA: 91.4 MM HG (ref 83–108)
PO2 TEMP ADJ BLD: 91.4 MM HG (ref 83–108)
VENTILATOR MODE: ABNORMAL

## 2019-11-04 PROCEDURE — 94010 BREATHING CAPACITY TEST: CPT | Performed by: INTERNAL MEDICINE

## 2019-11-04 PROCEDURE — 94729 DIFFUSING CAPACITY: CPT

## 2019-11-04 PROCEDURE — 82805 BLOOD GASES W/O2 SATURATION: CPT

## 2019-11-04 PROCEDURE — 94010 BREATHING CAPACITY TEST: CPT

## 2019-11-04 PROCEDURE — 94729 DIFFUSING CAPACITY: CPT | Performed by: INTERNAL MEDICINE

## 2019-11-04 PROCEDURE — 94727 GAS DIL/WSHOT DETER LNG VOL: CPT

## 2019-11-04 PROCEDURE — 36600 WITHDRAWAL OF ARTERIAL BLOOD: CPT

## 2019-11-04 PROCEDURE — 94727 GAS DIL/WSHOT DETER LNG VOL: CPT | Performed by: INTERNAL MEDICINE

## 2019-11-06 ENCOUNTER — TELEPHONE (OUTPATIENT)
Dept: CARDIOLOGY | Facility: CLINIC | Age: 64
End: 2019-11-06

## 2019-11-06 NOTE — TELEPHONE ENCOUNTER
Patient contacted to review PFT results. LVM requesting return call with any questions or to discuss further.

## 2019-11-20 DIAGNOSIS — R06.09 DOE (DYSPNEA ON EXERTION): Primary | ICD-10-CM

## 2019-11-20 DIAGNOSIS — R06.02 SHORTNESS OF BREATH: ICD-10-CM

## 2019-12-09 ENCOUNTER — OFFICE VISIT (OUTPATIENT)
Dept: PULMONOLOGY | Facility: CLINIC | Age: 64
End: 2019-12-09

## 2019-12-09 VITALS
HEART RATE: 84 BPM | SYSTOLIC BLOOD PRESSURE: 124 MMHG | OXYGEN SATURATION: 96 % | TEMPERATURE: 97.6 F | WEIGHT: 250.8 LBS | DIASTOLIC BLOOD PRESSURE: 76 MMHG | BODY MASS INDEX: 35.9 KG/M2 | HEIGHT: 70 IN

## 2019-12-09 DIAGNOSIS — R06.09 DYSPNEA ON EXERTION: Primary | ICD-10-CM

## 2019-12-09 DIAGNOSIS — R05.9 COUGH: ICD-10-CM

## 2019-12-09 PROCEDURE — 99205 OFFICE O/P NEW HI 60 MIN: CPT | Performed by: INTERNAL MEDICINE

## 2019-12-09 RX ORDER — LEVOCETIRIZINE DIHYDROCHLORIDE 5 MG/1
5 TABLET, FILM COATED ORAL EVERY EVENING
Qty: 30 TABLET | Refills: 11 | Status: SHIPPED | OUTPATIENT
Start: 2019-12-09 | End: 2020-06-22

## 2019-12-09 RX ORDER — FLUTICASONE PROPIONATE 50 MCG
2 SPRAY, SUSPENSION (ML) NASAL 2 TIMES DAILY
Qty: 18.2 ML | Refills: 11 | Status: SHIPPED | OUTPATIENT
Start: 2019-12-09 | End: 2020-06-22

## 2019-12-09 NOTE — PROGRESS NOTES
CC:    Shortness of air    HPI:    65 y/o WM w/ h/o KAYLEIGH on CPAP, ~75 py smoking (3ppd x ~25 years) quit 1994, CAD s/p 5vCABG 2004, cardiac stents, HTN, HLD, DM, prostate cancer 2010, referred by Dr. Vargas for evaluation of SOA.  Patient recently noted to have increasing shortness of air with exertion.  This is similar to how he felt when he ended up having CABG and stenting.  He saw his Cardiologist who performed LHC that showed native MVCAD, widely patent 5v Bypass as well as patent prior stent, EF 50%, LVEDP 18.  He did have a stress prior to this that did not show marked elevations in BP or exertional hypoxemia.  Echo w/ Grade 1 Diastolic Dysfunction and moderate LA dilation.  Patient hasn't had any history of asthma as a child.  He does get seasonal allergies.  Apparently has been put on 6 weeks of abx by another provider for possible infection.  Patient reports nasal congestion and persistent cough.  No overt wheezing or typical asthma triggers.  He also notes SHUKLA when bending down.  No hemoptysis.    PMH:    Past Medical History:   Diagnosis Date   • Absent left knee reflex     His wife showed a concern about absent knee reflexes and according to the patient this could have been attributed to his severe back related problem.    • CAD (coronary artery disease)     CABG   • CHF (congestive heart failure) (CMS/HCC)    • Diabetes mellitus (CMS/HCC)    • ED (erectile dysfunction)     Further history revealed that he also has erectile dysfunction which he attributed may be due to s/p prostatic cancer.   • GERD (gastroesophageal reflux disease)    • Hyperlipidemia    • Hypertension    • Lower extremity edema      This seems to be fairly resolved at this point.    • Obesity    • KAYLEIGH on CPAP    • Prostate cancer (CMS/HCC)    • SOB (shortness of breath)       During his first visit, the patient mentioned having very occasional shortness of air which he correlates with gaining weight.  This seems to be fairly resolved at  this point.      PSH:    Past Surgical History:   Procedure Laterality Date   • CARDIAC CATHETERIZATION     • CARDIAC CATHETERIZATION N/A 2018    Procedure: Left Heart Cath;  Surgeon: Hari Vargas MD;  Location:  [facility];  Service: Cardiovascular   • CARDIAC CATHETERIZATION N/A 2018    Procedure: Coronary angiography;  Surgeon: Hari Vargas MD;  Location:  [facility];  Service: Cardiovascular   • CARDIAC CATHETERIZATION N/A 10/22/2019    Procedure: Left Heart Cath;  Surgeon: Hari Vargas MD;  Location:  [facility];  Service: Cardiology   • CARDIAC SURGERY     • COLONOSCOPY     • CORONARY ARTERY BYPASS GRAFT      x5   • PROSTATECTOMY      Status post radical prostatectomy for carcinoma.     FH:    Family History   Problem Relation Age of Onset   • Heart attack Father    • Heart disease Father    • Cancer Father    • Cancer Mother    • Heart disease Sister    • Diabetes Sister    • Heart disease Brother      SH:    Social History     Socioeconomic History   • Marital status:      Spouse name: Not on file   • Number of children: Not on file   • Years of education: Not on file   • Highest education level: Not on file   Tobacco Use   • Smoking status: Former Smoker     Packs/day: 3.00     Years: 26.00     Pack years: 78.00     Types: Cigarettes     Last attempt to quit:      Years since quittin.9   • Smokeless tobacco: Never Used   Substance and Sexual Activity   • Alcohol use: No   • Drug use: No   • Sexual activity: Defer   Social History Narrative    Caffeine use: 2 servings daily.     ALLERGIES:    Allergies   Allergen Reactions   • Iodine Itching and Rash   • Spironolactone Diarrhea     He was not able to tolerate the spironolactone secondary to diarrhea.     MEDICATIONS:      Current Outpatient Medications:   •  aspirin 81 MG tablet, Take 1 tablet by mouth Daily. (Patient taking differently: Take 81 mg by mouth Every Night.), Disp:  90 tablet, Rfl: 3  •  atorvastatin (LIPITOR) 80 MG tablet, take 1 tablet by mouth once daily (Patient taking differently: take 1 tablet by mouth once QHS), Disp: 90 tablet, Rfl: 3  •  B-D UF III MINI PEN NEEDLES 31G X 5 MM misc, See Admin Instructions., Disp: , Rfl: 0  •  BD PEN NEEDLE FANNIE U/F 32G X 4 MM misc, Daily., Disp: , Rfl: 0  •  carvedilol (COREG) 12.5 MG tablet, Take 1 tablet by mouth 2 (Two) Times a Day With Meals., Disp: 180 tablet, Rfl: 3  •  DULoxetine (CYMBALTA) 60 MG capsule, Take 60 mg by mouth Daily., Disp: , Rfl: 0  •  furosemide (LASIX) 20 MG tablet, Take 1 tablet by mouth Daily As Needed (Swelling)., Disp: 60 tablet, Rfl: 11  •  losartan (COZAAR) 100 MG tablet, Take 1 tablet by mouth Daily., Disp: 90 tablet, Rfl: 3  •  metFORMIN (GLUCOPHAGE) 500 MG tablet, Take 1 tablet by mouth 2 (Two) Times a Day., Disp: , Rfl: 0  •  omeprazole (priLOSEC) 20 MG capsule, Take 20 mg by mouth Daily., Disp: , Rfl:   •  TRESIBA FLEXTOUCH 200 UNIT/ML solution pen-injector, Inject 50 Units under the skin into the appropriate area as directed Every Night., Disp: , Rfl: 0  •  butalbital-acetaminophen-caffeine (FIORICET, ESGIC) -40 MG per tablet, Take 1 tablet by mouth As Needed., Disp: , Rfl: 0  ROS:  Per HPI, otherwise all systems reviewed and negative.    DIAGNOSTIC DATA (Reviewed and interpreted by me unless otherwise specified):    PFT 11/4/19 - no obstruction, no restriction, normal DLCO    CXR 12/9/19 - no acute or chronic appearing lung disease    Vitals:    12/09/19 1546   BP: 124/76   Pulse: 84   Temp: 97.6 °F (36.4 °C)   SpO2: 96%       Physical Exam   Constitutional: Oriented to person, place, and time. Appears well-developed and well-nourished.   Head: Normocephalic and atraumatic.   Nose: Nose normal.   Mouth/Throat: Oropharynx with cobblestonning.   Eyes: Conjunctivae are normal.  Pupils normal.  Neck: No tracheal deviation present.   Cardiovascular: Normal rate, regular rhythm, normal heart sounds  and intact distal pulses.  Exam reveals no gallop and no friction rub.  No thrill.  No JVD.  No edema.  No murmur heard.  Pulmonary/Chest: Effort normal and breath sounds normal.  No tenderness to palpation.  No clubbing.   Abdominal: Soft. Bowel sounds are normal. No distension. No tenderness. There is no guarding.   Musculoskeletal: Normal range of motion.  No tenderness.  Lymphadenopathy:  No cervical adenopathy.   Neurological:  No new focal neurological deficits observed   Skin: Skin is warm and dry. No rash noted.   Psychiatric: Normal mood and affect.  Behavior is normal. Judgment normal.    Assessment/Plan     1)  Dyspnea - I think this is a combination of Diastolic Heart Failure, Obesity, Deconditioning, and possibly some mild COPD / Asthma.  His KAYLEIGH is followed by another practitioner.  Will give a trial of Trelegy 1 puff daily to see if this helps his symptoms.  Check radha next visit to see improvement from prior (reversability).  Consider checking eos, ige, allergy panel next visit.  Encouraged weight loss and cardiovascular exercise.    2) Chronic Cough - likely post nasal gtt, start flonase and xyzal.  Doubt infectious if no improvement with such a prolonged course of outpatient antibiotics.  If no improvement next visit consider HRCT, methacholine challenge, GERD evaluation.    RTC 6 weeks with 6MW and Byron    Javon Bergman MD  Pulmonology and Critical Care Medicine  12/09/19 4:46 PM  Electronically Signed    C.C.:  Hari Vargas MD, Wendy Allen APRN

## 2020-02-05 ENCOUNTER — TELEPHONE (OUTPATIENT)
Dept: CARDIOLOGY | Facility: CLINIC | Age: 65
End: 2020-02-05

## 2020-02-05 DIAGNOSIS — I49.3 PVC (PREMATURE VENTRICULAR CONTRACTION): Primary | ICD-10-CM

## 2020-02-05 NOTE — TELEPHONE ENCOUNTER
----- Message from Hari Vargas MD sent at 2/5/2020  1:24 PM EST -----  Can you clarify why the patient had a ECG done? Then have him wear a ZIO patch for 7 days.  Depending on what we find, we may switch his carvedilol to sotalol

## 2020-02-05 NOTE — TELEPHONE ENCOUNTER
Patient contacted with recommendations per MJS. Pt to have ZIO placed for 7 days. LVM requesting return call to discuss.

## 2020-02-11 RX ORDER — CLOPIDOGREL BISULFATE 75 MG/1
TABLET ORAL
Qty: 90 TABLET | Refills: 3 | OUTPATIENT
Start: 2020-02-11

## 2020-02-11 RX ORDER — CARVEDILOL 12.5 MG/1
TABLET ORAL
Qty: 180 TABLET | Refills: 3 | Status: SHIPPED | OUTPATIENT
Start: 2020-02-11 | End: 2021-12-06 | Stop reason: SDUPTHER

## 2020-02-27 ENCOUNTER — TELEPHONE (OUTPATIENT)
Dept: CARDIOLOGY | Facility: CLINIC | Age: 65
End: 2020-02-27

## 2020-02-27 DIAGNOSIS — I49.3 PVC (PREMATURE VENTRICULAR CONTRACTION): Primary | ICD-10-CM

## 2020-02-27 NOTE — TELEPHONE ENCOUNTER
Patient's wife returned call. Advised pt of results of heart monitor and recommendations. Patient to see EP MD in regards to PVCs.

## 2020-02-27 NOTE — TELEPHONE ENCOUNTER
LVM requesting return call to discuss monitor results and recommendations. Will await return call.

## 2020-02-27 NOTE — TELEPHONE ENCOUNTER
----- Message from Hari Vargas MD sent at 2/26/2020  4:11 PM EST -----  Please let the patient know he is having a lot of extra heartbeats called PVCs, 9.4% of the time.  can you set him up to see EP?  Thanks

## 2020-03-16 ENCOUNTER — TELEPHONE (OUTPATIENT)
Dept: CARDIOLOGY | Facility: CLINIC | Age: 65
End: 2020-03-16

## 2020-03-16 NOTE — TELEPHONE ENCOUNTER
Patient wife called to report that patient is having lower extremity edema. Patient denies any SOA outside of what is normal for him. Pt does state that he has had some weight gain.       Pt advised to take PRN dose of Lasix and call office on Wednesday for update on edema. Pt agreeable to plan, all questions answered at this time.

## 2020-03-20 ENCOUNTER — PATIENT MESSAGE (OUTPATIENT)
Dept: CARDIOLOGY | Facility: CLINIC | Age: 65
End: 2020-03-20

## 2020-06-22 ENCOUNTER — CONSULT (OUTPATIENT)
Dept: CARDIOLOGY | Facility: CLINIC | Age: 65
End: 2020-06-22

## 2020-06-22 VITALS
HEIGHT: 70 IN | BODY MASS INDEX: 36.36 KG/M2 | WEIGHT: 254 LBS | HEART RATE: 51 BPM | DIASTOLIC BLOOD PRESSURE: 82 MMHG | TEMPERATURE: 97.7 F | SYSTOLIC BLOOD PRESSURE: 144 MMHG

## 2020-06-22 DIAGNOSIS — I49.5 SINUS NODE DYSFUNCTION (HCC): Primary | ICD-10-CM

## 2020-06-22 DIAGNOSIS — R06.09 DYSPNEA ON EXERTION: ICD-10-CM

## 2020-06-22 DIAGNOSIS — I49.3 PVC (PREMATURE VENTRICULAR CONTRACTION): ICD-10-CM

## 2020-06-22 PROCEDURE — 93000 ELECTROCARDIOGRAM COMPLETE: CPT | Performed by: INTERNAL MEDICINE

## 2020-06-22 PROCEDURE — 99204 OFFICE O/P NEW MOD 45 MIN: CPT | Performed by: INTERNAL MEDICINE

## 2020-06-22 NOTE — PROGRESS NOTES
1.                        Cardiac Electrophysiology Outpatient Follow Up Note            [facility]     Follow Up Office Visit      Gen Torres  [id number]  06/22/2020  [unfilled]  [unfilled]    Primary Care Physician: Wendy Allen APRN    Referred By: Hari Vargas MD    Subjective     Chief Complaint:   Chief Complaint   Patient presents with   • Coronary Artery Disease   • chronic diastolic heart failure   Problem List:      2. Premature Ventricular Contractions  a. 7 day Zio Feb 2020 w/ HR , avg 59 bpm and VE burden 9.4% with multifocal PVC's  3. Coronary Artery DIsease  a. Remote myocardial infarction  b. Previous stent 1995  c. CABG x5 2005 [facility]  d. C 5/9/2018 w/ PCI to SVC-OM graft  e. ECHO 9/30/2019 EF 51-55%, mild-moderate LVH w/ LV segmental hypokinesis, mild MR, LA 5.1 cm, LV 1.4 cm  f. C 10/22/2019 with documented widely patent 5 vessel bypass grafts and stent, EF 50%  4. Chronic Diastolic Congestive Heart Failure  5. Essential Hypertension  6. Diabetes Mellitus  7. KAYLEIGH with CPAP compliance   8. GERD  Obesity     History of Present Illness:   Mr. Gen Torres is a 65 y.o. male who presents to my electrophysiology clinic for follow up of symptoms of fatigue.    He was referred by his cardiologist Dr Vargas who noted by ambulatory heart rhythm monitor the patient had ventricular ectopy with a burden approximately 9%.    Patient tells me he never feels palpitations.  He never has any sense of an irregular heartbeat.  His main complaint really is fatigue when he tries to get up and do anything.  Sitting in a chair he feels fine he never feels tired but with any degree of exertion such as climbing stairs walking a city block pushing a lawnmower he feels quite short of breath.  While he has gained some weight he does not think that this is the reason.  He does not ever check his heart rate while exercising.    He does not have chest pain nausea vomiting fevers  or chills.  Had no COVID-19 exposure..      Review of Systems:   Constitutional: No fevers or chills, no recent weight gain or weight loss or fatigue  Eyes: No visual loss, blurred vision, double vision, yellow sclerae.  ENT: No headaches, hearing loss, vertigo, congestion or sore throat.   Cardiovascular: Per HPI  Respiratory: No cough or wheezing, no sputum production, no hematemesis   Gastrointestinal: No abdominal pain, no nausea, vomiting, constipation, diarrhea, melena.   Genitourinary: No dysuria, hematuria or increased frequency.  Musculoskeletal:  No gait disturbance, weakness or joint pain or stiffness  Integumentary: No rashes, urticaria, ulcers or sores.   Neurological: No headache, dizziness, syncope, paralysis, ataxia, no prior CVA/TIA  Psychiatric: No anxiety, or depression  Endocrine: No diaphoresis, cold or heat intolerance. No polyuria or polydipsia.   Hematologic/Lymphatic: No anemia, abnormal bruising or bleeding. No history of DVT/PE.      Past Medical History:   Past Medical History:   Diagnosis Date   • Absent left knee reflex     His wife showed a concern about absent knee reflexes and according to the patient this could have been attributed to his severe back related problem.    • CAD (coronary artery disease)     CABG   • CHF (congestive heart failure) (CMS/HCC)    • Diabetes mellitus (CMS/HCC)    • ED (erectile dysfunction)     Further history revealed that he also has erectile dysfunction which he attributed may be due to s/p prostatic cancer.   • GERD (gastroesophageal reflux disease)    • Hyperlipidemia    • Hypertension    • Lower extremity edema      This seems to be fairly resolved at this point.    • Myocardial infarction (CMS/HCC)    • Obesity    • KAYLEIGH on CPAP    • Prostate cancer (CMS/HCC)    • SOB (shortness of breath)       During his first visit, the patient mentioned having very occasional shortness of air which he correlates with gaining weight.  This seems to be fairly  resolved at this point.        Past Surgical History:   Past Surgical History:   Procedure Laterality Date   • CARDIAC CATHETERIZATION     • CARDIAC CATHETERIZATION N/A 2018    Procedure: Left Heart Cath;  Surgeon: Hari Vargas MD;  Location:  [facility];  Service: Cardiovascular   • CARDIAC CATHETERIZATION N/A 2018    Procedure: Coronary angiography;  Surgeon: Hari Vargas MD;  Location:  [facility];  Service: Cardiovascular   • CARDIAC CATHETERIZATION N/A 10/22/2019    Procedure: Left Heart Cath;  Surgeon: Hari Vargas MD;  Location:  [facility];  Service: Cardiology   • CARDIAC SURGERY     • COLONOSCOPY     • CORONARY ARTERY BYPASS GRAFT      x5   • PROSTATECTOMY      Status post radical prostatectomy for carcinoma.       Family History:   Family History   Problem Relation Age of Onset   • Heart attack Father    • Heart disease Father    • Cancer Father    • Cancer Mother    • Heart disease Sister    • Diabetes Sister    • Heart disease Brother        Social History:   Social History     Socioeconomic History   • Marital status:      Spouse name: Not on file   • Number of children: Not on file   • Years of education: Not on file   • Highest education level: Not on file   Tobacco Use   • Smoking status: Former Smoker     Packs/day: 4.00     Years: 26.00     Pack years: 104.00     Types: Cigarettes     Start date: 1968     Last attempt to quit:      Years since quittin.4   • Smokeless tobacco: Never Used   Substance and Sexual Activity   • Alcohol use: No   • Drug use: No   • Sexual activity: Not Currently     Partners: Female   Social History Narrative    Caffeine use: 2 servings daily.       Medications:     Current Outpatient Medications:   •  aspirin 81 MG tablet, Take 1 tablet by mouth Daily. (Patient taking differently: Take 81 mg by mouth Every Night.), Disp: 90 tablet, Rfl: 3  •  atorvastatin (LIPITOR) 80 MG tablet, take 1  "tablet by mouth once daily (Patient taking differently: take 1 tablet by mouth once QHS), Disp: 90 tablet, Rfl: 3  •  B-D UF III MINI PEN NEEDLES 31G X 5 MM misc, See Admin Instructions., Disp: , Rfl: 0  •  BD PEN NEEDLE FANNIE U/F 32G X 4 MM misc, Daily., Disp: , Rfl: 0  •  carvedilol (COREG) 12.5 MG tablet, take 1 tablet by mouth twice a day with food, Disp: 180 tablet, Rfl: 3  •  DULoxetine (CYMBALTA) 60 MG capsule, Take 60 mg by mouth Daily., Disp: , Rfl: 0  •  furosemide (LASIX) 20 MG tablet, Take 1 tablet by mouth Daily As Needed (Swelling). (Patient taking differently: Take 20 mg by mouth As Needed (Swelling).), Disp: 60 tablet, Rfl: 11  •  losartan (COZAAR) 100 MG tablet, Take 1 tablet by mouth Daily., Disp: 90 tablet, Rfl: 3  •  metFORMIN (GLUCOPHAGE) 500 MG tablet, Take 1 tablet by mouth 2 (Two) Times a Day., Disp: , Rfl: 0  •  omeprazole (priLOSEC) 20 MG capsule, Take 20 mg by mouth Daily., Disp: , Rfl:   •  TRESIBA FLEXTOUCH 200 UNIT/ML solution pen-injector, Inject 50 Units under the skin into the appropriate area as directed Every Night., Disp: , Rfl: 0    Allergies:   Allergies   Allergen Reactions   • Iodine Itching and Rash   • Spironolactone Diarrhea     He was not able to tolerate the spironolactone secondary to diarrhea.       Objective     Physical Exam:  Vital Signs:   Vitals:    06/22/20 1050   BP: 144/82   BP Location: Right arm   Patient Position: Sitting   Pulse: 51   Temp: 97.7 °F (36.5 °C)   Weight: 115 kg (254 lb)   Height: 177.8 cm (70\")     GEN: Well nourished, well-developed, no acute distress  HEENT: Normocephalic, atraumatic, moist mucous membranes  NECK: Supple, no JVD, no thyromegaly, no lymphadenopathy  CARD: Regular rate and rhythm, normal S1 & S2 are present.  No murmur, gallop or rubs are appreciated.  LUNGS: Clear to auscultation bilateraly, normal respiratory effort  ABDOMEN: Soft, nontender, normal bowel sounds  EXTREMITIES: No gross deformities, no clubbing, cyanosis.  Edema " none  SKIN: Warm, dry  NEURO: No focal deficits, alert and oriented x 3  PSYCHIATRIC: Normal affect and mood, appropriate use of semantics and logic.        Lab Results   Component Value Date    GLUCOSE 200 (H) 10/22/2019    CALCIUM 10.0 10/22/2019     10/22/2019    K 4.4 10/22/2019    CO2 23.0 10/22/2019     10/22/2019    BUN 28 (H) 10/22/2019    CREATININE 1.14 10/22/2019    EGFRIFNONA 65 10/22/2019    BCR 24.6 10/22/2019    ANIONGAP 14.0 10/22/2019     Lab Results   Component Value Date    WBC 6.47 10/22/2019    HGB 16.6 10/22/2019    HCT 48.1 10/22/2019    MCV 95.1 10/22/2019     10/22/2019     Lab Results   Component Value Date    INR 1.01 05/08/2018    INR 1.04 01/02/2015    PROTIME 10.6 05/08/2018    PROTIME 11.1 01/02/2015     No results found for: TSH, E7HTATK, K3KCDXF, THYROIDAB    Cardiac Testing:     I personally viewed and interpreted the patient's EKG/Telemetry/lab data      ECG 12 Lead  Date/Time: 6/22/2020 1:16 PM  Performed by: Sandeep Marina DO  Authorized by: Sandeep Marina DO   Comparison: compared with previous ECG   Similar to previous ECG  Rhythm: sinus rhythm            Tobacco Cessation: N/A  Obstructive Sleep Apnea Screening: N/A    Assessment & Plan      Very pleasant 65-year-old gentleman with a history of occasional PVCs with a PVC burden back in February of 9%.  He has symptoms of exertional fatigue that may be due to chronotropic incompetence due to sinus node dysfunction.  He has preserved ejection fraction and has obstructive coronary disease with a remote history of coronary bypass graft surgery.  He is a class I indication for beta-blocker.  I suspect that he may be indeed chronotropic incompetent.    I discussed the concept of chronotropic competence with him in detail.  We discussed the sinus node and its function in providing us with a heart rhythm throughout the day during exercise in particular.  His EKG today demonstrates resting sinus bradycardia 51 bpm  and this combined with his age and beta-blocker history makes me suspicious that indeed he may be significantly chronotropic incompetent.  To this end I would like to evaluate him with a exercise modified Luke protocol stress test not for coronary disease or ischemia but rather again for assessing chronotropic competency.  Discussed with him the concept of pacemakers.  We discussed the surgical technique for receiving a pacemaker and that this is in the really the only treatment available for patients with sinus node dysfunction.  He and I left a conversation with the concept that if indeed he is chronotropic incompetent by his stress test that he would wish to proceed directly with a permanent pacemaker implantation.  I reviewed the risk-benefit profile with him extensively.  He is a clear understanding of my recommendations and wished to proceed in this fashion as outlined above.    Regarding the PVCs I do not think that they are terribly frequent and certainly they are asymptomatic.  I think the main issue really here is chronotropic incompetence but we will be careful and determine this officially.    If his stress test is positive for chronotropic competence we will book him for a dual-chamber permanent pacemaker with [employer] in the near future.    There are no diagnoses linked to this encounter.      Follow Up:       Thank you for allowing me to participate in the care of your patient. Please to not hesitate to contact me with additional questions or concerns.        Sandeep Marina, DO, FACC, [facility]  Cardiac Electrophysiologist

## 2020-06-29 RX ORDER — LOSARTAN POTASSIUM 100 MG/1
100 TABLET ORAL DAILY
Qty: 90 TABLET | Refills: 3 | Status: SHIPPED | OUTPATIENT
Start: 2020-06-29 | End: 2021-12-06 | Stop reason: SDUPTHER

## 2020-07-06 ENCOUNTER — OFFICE VISIT (OUTPATIENT)
Dept: CARDIOLOGY | Facility: CLINIC | Age: 65
End: 2020-07-06

## 2020-07-06 VITALS
HEIGHT: 70 IN | BODY MASS INDEX: 36.22 KG/M2 | SYSTOLIC BLOOD PRESSURE: 150 MMHG | DIASTOLIC BLOOD PRESSURE: 82 MMHG | HEART RATE: 54 BPM | WEIGHT: 253 LBS | OXYGEN SATURATION: 96 %

## 2020-07-06 DIAGNOSIS — I10 ESSENTIAL HYPERTENSION: ICD-10-CM

## 2020-07-06 DIAGNOSIS — I50.32 CHRONIC DIASTOLIC HEART FAILURE (HCC): ICD-10-CM

## 2020-07-06 DIAGNOSIS — I25.118 CORONARY ARTERY DISEASE OF NATIVE ARTERY OF NATIVE HEART WITH STABLE ANGINA PECTORIS (HCC): Primary | ICD-10-CM

## 2020-07-06 DIAGNOSIS — I49.3 PVC (PREMATURE VENTRICULAR CONTRACTION): ICD-10-CM

## 2020-07-06 DIAGNOSIS — E78.2 MIXED HYPERLIPIDEMIA: ICD-10-CM

## 2020-07-06 PROBLEM — R94.39 ABNORMAL STRESS TEST: Status: RESOLVED | Noted: 2019-10-11 | Resolved: 2020-07-06

## 2020-07-06 PROCEDURE — 99214 OFFICE O/P EST MOD 30 MIN: CPT | Performed by: INTERNAL MEDICINE

## 2020-07-06 RX ORDER — POTASSIUM CHLORIDE 1500 MG/1
20 TABLET, FILM COATED, EXTENDED RELEASE ORAL DAILY
Qty: 30 TABLET | Refills: 5 | Status: SHIPPED | OUTPATIENT
Start: 2020-07-06 | End: 2021-12-06 | Stop reason: SDUPTHER

## 2020-07-06 NOTE — PROGRESS NOTES
[facility] AT [address], [address]  [place], [place], [address] [phone]  [facility]           OUTPATIENT CLINIC FOLLOW UP NOTE    Patient Care Team:  Patient Care Team:  Wendy Allen APRN as PCP - General    Subjective:   Reason for consultation:   Chief Complaint   Patient presents with   • Coronary Artery Disease       HPI:    Gen Torres is a 65 y.o. male.  Cardiac problem list:  1. Multivessel CAD   1. Remote stenting  2. Status post 5 vessel bypass (LIMA to LAD and diag, SVG to PDA, SVG to OM )  2. Chronic diastolic heart failure  3. Persistent shortness of breath of unknown etiology  1. Patent bypass grafts on heart cath 10/2019  2. Unremarkable chest x-ray, PFTs 11/2019  4. Relative bradycardia  5. PVCs  1. 9% burden on heart monitor 2/2020  6. Essential hypertension  7. Mixed hyperlipidemia  8. Diabetes.      He presents today for follow-up.      Since his last visit the patient continues to complain of intermittent exertional dyspnea, fatigue.  Denies chest pain.  Denies palpitations.  Has chronic lower extremity swelling left greater than right, responsive to Lasix.  He takes Lasix for a few days but then develops right lower quadrant and leg cramps.  Stops Lasix and this improves.    Awaiting GXT to evaluate for chronotropic incompetence, as recommended and ordered by Dr. Marina      Review of Systems:  Positive for shortness of breath, lower extremity swelling, fatigue  Negative for exertional chest pain, orthopnea, PND, palpitations, lightheadedness, syncope.     PFSH:  Patient Active Problem List   Diagnosis   • Obesity   • Diabetes mellitus (CMS/HCC)   • Mixed hyperlipidemia   • Essential hypertension   • KAYLEIGH on CPAP   • ED (erectile dysfunction)   • Chronic diastolic heart failure (CMS/HCC)   • Coronary artery disease of native artery of native heart with stable angina pectoris (CMS/HCC)   • Dyspnea on exertion   • Fatigue   • Sinus  node dysfunction (CMS/HCC)   • PVC (premature ventricular contraction)         Current Outpatient Medications:   •  aspirin 81 MG tablet, Take 1 tablet by mouth Daily. (Patient taking differently: Take 81 mg by mouth Every Night.), Disp: 90 tablet, Rfl: 3  •  atorvastatin (LIPITOR) 80 MG tablet, take 1 tablet by mouth once daily (Patient taking differently: take 1 tablet by mouth once QHS), Disp: 90 tablet, Rfl: 3  •  B-D UF III MINI PEN NEEDLES 31G X 5 MM misc, See Admin Instructions., Disp: , Rfl: 0  •  BD PEN NEEDLE FANNIE U/F 32G X 4 MM misc, Daily., Disp: , Rfl: 0  •  carvedilol (COREG) 12.5 MG tablet, take 1 tablet by mouth twice a day with food, Disp: 180 tablet, Rfl: 3  •  DULoxetine (CYMBALTA) 60 MG capsule, Take 60 mg by mouth Daily., Disp: , Rfl: 0  •  furosemide (LASIX) 20 MG tablet, Take 1 tablet by mouth Daily As Needed (Swelling). (Patient taking differently: Take 20 mg by mouth As Needed (Swelling).), Disp: 60 tablet, Rfl: 11  •  losartan (COZAAR) 100 MG tablet, Take 1 tablet by mouth Daily., Disp: 90 tablet, Rfl: 3  •  metFORMIN (GLUCOPHAGE) 500 MG tablet, Take 1 tablet by mouth 2 (Two) Times a Day., Disp: , Rfl: 0  •  omeprazole (priLOSEC) 20 MG capsule, Take 20 mg by mouth Daily., Disp: , Rfl:   •  potassium chloride (K-TAB) 20 MEQ tablet controlled-release ER tablet, Take 1 tablet by mouth Daily. When taking Lasix., Disp: 30 tablet, Rfl: 5  •  TRESIBA FLEXTOUCH 200 UNIT/ML solution pen-injector, Inject 50 Units under the skin into the appropriate area as directed Every Night., Disp: , Rfl: 0    Allergies   Allergen Reactions   • Iodine Itching and Rash   • Spironolactone Diarrhea     He was not able to tolerate the spironolactone secondary to diarrhea.        reports that he quit smoking about 26 years ago. His smoking use included cigarettes. He started smoking about 52 years ago. He has a 104.00 pack-year smoking history. He has never used smokeless tobacco.    Family History   Problem Relation  "Age of Onset   • Heart attack Father    • Heart disease Father    • Cancer Father    • Cancer Mother    • Heart disease Sister    • Diabetes Sister    • Heart disease Brother          Objective:   Blood pressure 150/82, pulse 54, height 177.8 cm (70\"), weight 115 kg (253 lb), SpO2 96 %.  CONSTITUTIONAL: No acute distress, normal affect  RESPIRATORY: Normal effort. Clear to auscultation bilaterally without wheezing or rales  CARDIOVASCULAR: Regular rate and rhythm with normal S1 and S2. Without gallop or rub.  Systolic ejection murmur at the right upper sternal border, no radiation  PERIPHERAL VASCULAR: Normal radial pulses bilaterally. There is no peripheral edema bilaterally.    Labs:  Lab Results   Component Value Date    ALT 31 05/08/2018    AST 21 05/08/2018     Lab Results   Component Value Date    CHOL 171 10/22/2019    TRIG 115 10/22/2019    HDL 31 (L) 10/22/2019    CREATININE 1.14 10/22/2019       Diagnostic Data:    Procedures    ZIO heart monitor 2/2020: PVC burden 9%, symptomatic PVCs on occasion    [facility] 10/2019  · There is severe multivessel coronary artery disease with widely patent 5 vessel bypass as well as a previously placed stent  · There is no new significant change in the coronary anatomy when compared to the postintervention angiographic images from May 9, 2018  · Low normal left ventricular ejection fraction 50%      Stress test 10/2019  · Left ventricular ejection fraction is normal (Calculated EF = 51%).  · There were occasional PVCs throughout the study.  · Myocardial perfusion imaging indicates a large-sized infarct located in the lateral wall and inferior wall with mild ivonne-infarct ischemia.  · Impressions are consistent with an intermediate risk study.    [facility] 5/2018  · There was severe multivessel coronary artery disease involving a 80% diffuse proximal LAD stenosis, 100%  of the left circumflex artery, 100%  of the right coronary artery.  There is a patent sequential LIMA to " diagonal branch and LAD, patent sequential SVG to a diagonal branch and to a large OM, and a patent SVG to the RPDA.  · There was an 80% discrete stenosis just distal to the SVG to OM anastomosis that was found to be functionally significant with an iFR ratio 0.87.  This lesion is now status post intervention with a Xience Alpine 2.75 x 12 mm drug-eluting stent.    [facility] 9/2019  · Left ventricular systolic function is normal. Estimated EF appears to be in the range of 51 - 55%.  · Left ventricular wall thickness is consistent with mild-to-moderate concentric hypertrophy.  · The following left ventricular wall segments are hypokinetic: mid inferolateral, apical inferior and mid inferior.  · Left ventricular diastolic dysfunction (grade I a) consistent with impaired relaxation.  · Left atrial cavity size is moderately dilated.  · Mild mitral valve regurgitation is present    [facility] 5/2018  · Left ventricular systolic function is normal. Estimated EF = 55%.  · The following left ventricular wall segments are hypokinetic: mid inferolateral and mid inferior.  · Left ventricular diastolic dysfunction (grade I a) consistent with impaired relaxation.  · Left atrial cavity size is mildly dilated.  · Mild mitral valve regurgitation is present    Assessment and Plan:   Gen was seen today for hypertension, hyperlipidemia and coronary artery disease.    Diagnoses and all orders for this visit:    Coronary artery disease involving native coronary artery of native heart without angina pectoris  Mixed hyperlipidemia  -CCS class 0  -Continue DAPT, carvedilol, atorvastatin    Chronic diastolic heart failure  Lower extremity swelling  -EF 55% 5/2018  -Continue Lasix as needed.  -Prescribing potassium 20 mEq as needed to be taken with each Lasix dose to help prevent cramping     PVCs  Possible chronotropic incompetence  -GXT later this month to rule out chronotropic incompetence; possible pacemaker if found to be chronotropic incompetent.   Discussed the risks and benefits of a pacemaker implant with the patient today.  He seems agreeable to proceed if indeed he is found to be chronotropic incompetent.  -Continue beta-blocker  -Continue follow-up with electrophysiology    Essential hypertension  -At goal, continue current medications.     - Return in about 6 months (around 1/6/2021) for Next scheduled follow up.      Hari Vargas MD  07/06/20 16:54

## 2020-07-27 ENCOUNTER — HOSPITAL ENCOUNTER (OUTPATIENT)
Dept: CARDIOLOGY | Facility: HOSPITAL | Age: 65
Discharge: HOME | End: 2020-07-27
Admitting: INTERNAL MEDICINE

## 2020-07-27 VITALS
DIASTOLIC BLOOD PRESSURE: 80 MMHG | BODY MASS INDEX: 36.3 KG/M2 | SYSTOLIC BLOOD PRESSURE: 158 MMHG | HEART RATE: 58 BPM | HEIGHT: 70 IN | OXYGEN SATURATION: 99 % | WEIGHT: 253.53 LBS | RESPIRATION RATE: 18 BRPM

## 2020-07-27 DIAGNOSIS — R06.09 DYSPNEA ON EXERTION: ICD-10-CM

## 2020-07-27 DIAGNOSIS — I49.3 PVC (PREMATURE VENTRICULAR CONTRACTION): ICD-10-CM

## 2020-07-27 DIAGNOSIS — I49.5 SINUS NODE DYSFUNCTION (HCC): ICD-10-CM

## 2020-07-27 PROCEDURE — 93017 CV STRESS TEST TRACING ONLY: CPT

## 2020-07-27 PROCEDURE — 93018 CV STRESS TEST I&R ONLY: CPT | Performed by: INTERNAL MEDICINE

## 2020-07-31 DIAGNOSIS — Z00.00 HEALTHCARE MAINTENANCE: ICD-10-CM

## 2020-07-31 RX ORDER — ATORVASTATIN CALCIUM 80 MG/1
TABLET, FILM COATED ORAL
Qty: 90 TABLET | Refills: 3 | Status: SHIPPED | OUTPATIENT
Start: 2020-07-31 | End: 2021-12-06 | Stop reason: SDUPTHER

## 2020-08-03 LAB
BH CV STRESS BP STAGE 1: NORMAL
BH CV STRESS BP STAGE 2: NORMAL
BH CV STRESS DURATION MIN STAGE 1: 3
BH CV STRESS DURATION MIN STAGE 2: 3
BH CV STRESS DURATION SEC STAGE 3: 28
BH CV STRESS GRADE STAGE 1: 10
BH CV STRESS GRADE STAGE 2: 12
BH CV STRESS GRADE STAGE 3: 14
BH CV STRESS HR STAGE 1: 91
BH CV STRESS HR STAGE 2: 123
BH CV STRESS HR STAGE 3: 133
BH CV STRESS METS STAGE 1: 5
BH CV STRESS METS STAGE 2: 7.5
BH CV STRESS METS STAGE 3: 10
BH CV STRESS O2 STAGE 1: 99
BH CV STRESS O2 STAGE 2: 97
BH CV STRESS O2 STAGE 3: 96
BH CV STRESS PROTOCOL 1: NORMAL
BH CV STRESS RECOVERY BP: NORMAL MMHG
BH CV STRESS RECOVERY HR: 63 BPM
BH CV STRESS RECOVERY O2: 100 %
BH CV STRESS SPEED STAGE 1: 1.7
BH CV STRESS SPEED STAGE 2: 2.5
BH CV STRESS SPEED STAGE 3: 3.4
BH CV STRESS STAGE 1: 1
BH CV STRESS STAGE 2: 2
BH CV STRESS STAGE 3: 3
MAXIMAL PREDICTED HEART RATE: 156 BPM
PERCENT MAX PREDICTED HR: 87.18 %
STRESS BASELINE BP: NORMAL MMHG
STRESS BASELINE HR: 70 BPM
STRESS O2 SAT REST: 99 %
STRESS PERCENT HR: 103 %
STRESS POST ESTIMATED WORKLOAD: 7.6 METS
STRESS POST EXERCISE DUR MIN: 6 MIN
STRESS POST EXERCISE DUR SEC: 28 SEC
STRESS POST O2 SAT PEAK: 96 %
STRESS POST PEAK BP: NORMAL MMHG
STRESS POST PEAK HR: 136 BPM
STRESS TARGET HR: 133 BPM

## 2020-08-04 ENCOUNTER — TELEPHONE (OUTPATIENT)
Dept: CARDIOLOGY | Facility: CLINIC | Age: 65
End: 2020-08-04

## 2020-08-04 DIAGNOSIS — I49.5 CHRONOTROPIC INCOMPETENCE WITH SINUS NODE DYSFUNCTION (HCC): Primary | ICD-10-CM

## 2020-08-04 DIAGNOSIS — I49.5 SINUS NODE DYSFUNCTION (HCC): ICD-10-CM

## 2020-08-04 NOTE — TELEPHONE ENCOUNTER
Patient saw you in clinic on 6/22/20. He already had the stress test and would like to know what the plan is at this point?

## 2020-08-13 PROBLEM — I49.5 CHRONOTROPIC INCOMPETENCE WITH SINUS NODE DYSFUNCTION: Status: ACTIVE | Noted: 2020-08-13

## 2020-08-13 PROBLEM — I49.8 CHRONOTROPIC INCOMPETENCE WITH SINUS NODE DYSFUNCTION: Status: ACTIVE | Noted: 2020-08-13

## 2020-08-14 ENCOUNTER — PREP FOR SURGERY (OUTPATIENT)
Dept: OTHER | Facility: HOSPITAL | Age: 65
End: 2020-08-14

## 2020-08-14 DIAGNOSIS — I49.5 SINUS NODE DYSFUNCTION (HCC): Primary | ICD-10-CM

## 2020-08-14 RX ORDER — NITROGLYCERIN 0.4 MG/1
0.4 TABLET SUBLINGUAL
Status: CANCELLED | OUTPATIENT
Start: 2020-08-14

## 2020-08-14 RX ORDER — SODIUM CHLORIDE 0.9 % (FLUSH) 0.9 %
10 SYRINGE (ML) INJECTION AS NEEDED
Status: CANCELLED | OUTPATIENT
Start: 2020-08-14

## 2020-08-14 RX ORDER — SODIUM CHLORIDE 0.9 % (FLUSH) 0.9 %
3 SYRINGE (ML) INJECTION EVERY 12 HOURS SCHEDULED
Status: CANCELLED | OUTPATIENT
Start: 2020-08-14

## 2020-08-14 RX ORDER — ONDANSETRON 2 MG/ML
4 INJECTION INTRAMUSCULAR; INTRAVENOUS EVERY 6 HOURS PRN
Status: CANCELLED | OUTPATIENT
Start: 2020-08-14

## 2020-08-14 RX ORDER — CEFAZOLIN SODIUM 2 G/100ML
2 INJECTION, SOLUTION INTRAVENOUS ONCE
Status: CANCELLED | OUTPATIENT
Start: 2020-08-14 | End: 2020-08-14

## 2020-08-14 RX ORDER — ACETAMINOPHEN 325 MG/1
650 TABLET ORAL EVERY 4 HOURS PRN
Status: CANCELLED | OUTPATIENT
Start: 2020-08-14

## 2020-08-14 RX ORDER — SODIUM CHLORIDE 9 MG/ML
1 INJECTION, SOLUTION INTRAVENOUS CONTINUOUS
Status: CANCELLED | OUTPATIENT
Start: 2020-08-14 | End: 2020-08-14

## 2020-08-30 ENCOUNTER — APPOINTMENT (OUTPATIENT)
Dept: PREADMISSION TESTING | Facility: HOSPITAL | Age: 65
End: 2020-08-30

## 2020-08-30 DIAGNOSIS — I49.5 SINUS NODE DYSFUNCTION (HCC): ICD-10-CM

## 2020-08-30 LAB
DEPRECATED RDW RBC AUTO: 45.3 FL (ref 37–54)
ERYTHROCYTE [DISTWIDTH] IN BLOOD BY AUTOMATED COUNT: 13.2 % (ref 12.3–15.4)
HCT VFR BLD AUTO: 40.8 % (ref 37.5–51)
HGB BLD-MCNC: 14 G/DL (ref 13–17.7)
MCH RBC QN AUTO: 32.5 PG (ref 26.6–33)
MCHC RBC AUTO-ENTMCNC: 34.3 G/DL (ref 31.5–35.7)
MCV RBC AUTO: 94.7 FL (ref 79–97)
PLATELET # BLD AUTO: 122 10*3/MM3 (ref 140–450)
PMV BLD AUTO: 9.5 FL (ref 6–12)
RBC # BLD AUTO: 4.31 10*6/MM3 (ref 4.14–5.8)
WBC # BLD AUTO: 6.25 10*3/MM3 (ref 3.4–10.8)

## 2020-08-30 PROCEDURE — 85027 COMPLETE CBC AUTOMATED: CPT | Performed by: NURSE PRACTITIONER

## 2020-08-30 PROCEDURE — U0002 COVID-19 LAB TEST NON-CDC: HCPCS

## 2020-08-30 PROCEDURE — C9803 HOPD COVID-19 SPEC COLLECT: HCPCS

## 2020-08-30 PROCEDURE — 36415 COLL VENOUS BLD VENIPUNCTURE: CPT

## 2020-08-30 PROCEDURE — U0004 COV-19 TEST NON-CDC HGH THRU: HCPCS

## 2020-08-30 RX ORDER — FEXOFENADINE HCL 180 MG/1
180 TABLET ORAL DAILY
COMMUNITY

## 2020-08-31 LAB
REF LAB TEST METHOD: NORMAL
SARS-COV-2 RNA RESP QL NAA+PROBE: NOT DETECTED

## 2020-09-01 ENCOUNTER — APPOINTMENT (OUTPATIENT)
Dept: GENERAL RADIOLOGY | Facility: HOSPITAL | Age: 65
End: 2020-09-01

## 2020-09-01 ENCOUNTER — HOSPITAL ENCOUNTER (OUTPATIENT)
Facility: HOSPITAL | Age: 65
Discharge: HOME | End: 2020-09-02
Attending: INTERNAL MEDICINE | Admitting: INTERNAL MEDICINE

## 2020-09-01 DIAGNOSIS — I49.5 CHRONOTROPIC INCOMPETENCE WITH SINUS NODE DYSFUNCTION (HCC): ICD-10-CM

## 2020-09-01 DIAGNOSIS — I49.5 SINUS NODE DYSFUNCTION (HCC): ICD-10-CM

## 2020-09-01 LAB
ANION GAP SERPL CALCULATED.3IONS-SCNC: 10 MMOL/L (ref 5–15)
BUN SERPL-MCNC: 14 MG/DL (ref 8–23)
BUN/CREAT SERPL: 14.4 (ref 7–25)
CALCIUM SPEC-SCNC: 9 MG/DL (ref 8.6–10.5)
CHLORIDE SERPL-SCNC: 106 MMOL/L (ref 98–107)
CO2 SERPL-SCNC: 25 MMOL/L (ref 22–29)
CREAT SERPL-MCNC: 0.97 MG/DL (ref 0.76–1.27)
GFR SERPL CREATININE-BSD FRML MDRD: 78 ML/MIN/1.73
GLUCOSE BLDC GLUCOMTR-MCNC: 111 MG/DL (ref 70–130)
GLUCOSE SERPL-MCNC: 141 MG/DL (ref 65–99)
POTASSIUM SERPL-SCNC: 4.2 MMOL/L (ref 3.5–5.2)
SODIUM SERPL-SCNC: 141 MMOL/L (ref 136–145)

## 2020-09-01 PROCEDURE — 94660 CPAP INITIATION&MGMT: CPT

## 2020-09-01 PROCEDURE — G0378 HOSPITAL OBSERVATION PER HR: HCPCS

## 2020-09-01 PROCEDURE — 93005 ELECTROCARDIOGRAM TRACING: CPT | Performed by: INTERNAL MEDICINE

## 2020-09-01 PROCEDURE — 25010000003 LIDOCAINE 1 % SOLUTION: Performed by: INTERNAL MEDICINE

## 2020-09-01 PROCEDURE — 99152 MOD SED SAME PHYS/QHP 5/>YRS: CPT | Performed by: INTERNAL MEDICINE

## 2020-09-01 PROCEDURE — 25010000002 MIDAZOLAM PER 1 MG: Performed by: INTERNAL MEDICINE

## 2020-09-01 PROCEDURE — C1898 LEAD, PMKR, OTHER THAN TRANS: HCPCS | Performed by: INTERNAL MEDICINE

## 2020-09-01 PROCEDURE — C1892 INTRO/SHEATH,FIXED,PEEL-AWAY: HCPCS | Performed by: INTERNAL MEDICINE

## 2020-09-01 PROCEDURE — 80048 BASIC METABOLIC PNL TOTAL CA: CPT | Performed by: INTERNAL MEDICINE

## 2020-09-01 PROCEDURE — 33208 INSRT HEART PM ATRIAL & VENT: CPT | Performed by: INTERNAL MEDICINE

## 2020-09-01 PROCEDURE — S0260 H&P FOR SURGERY: HCPCS | Performed by: INTERNAL MEDICINE

## 2020-09-01 PROCEDURE — 94799 UNLISTED PULMONARY SVC/PX: CPT

## 2020-09-01 PROCEDURE — 25010000002 FENTANYL CITRATE (PF) 100 MCG/2ML SOLUTION: Performed by: INTERNAL MEDICINE

## 2020-09-01 PROCEDURE — 99153 MOD SED SAME PHYS/QHP EA: CPT | Performed by: INTERNAL MEDICINE

## 2020-09-01 PROCEDURE — C1785 PMKR, DUAL, RATE-RESP: HCPCS | Performed by: INTERNAL MEDICINE

## 2020-09-01 PROCEDURE — 71046 X-RAY EXAM CHEST 2 VIEWS: CPT

## 2020-09-01 PROCEDURE — 25010000003 CEFAZOLIN IN DEXTROSE 2-4 GM/100ML-% SOLUTION: Performed by: NURSE PRACTITIONER

## 2020-09-01 PROCEDURE — 82962 GLUCOSE BLOOD TEST: CPT

## 2020-09-01 PROCEDURE — 25010000002 ONDANSETRON PER 1 MG: Performed by: INTERNAL MEDICINE

## 2020-09-01 DEVICE — PACE/SENSE LEAD
Type: IMPLANTABLE DEVICE | Status: FUNCTIONAL
Brand: INGEVITY™+

## 2020-09-01 DEVICE — PACEMAKER
Type: IMPLANTABLE DEVICE | Status: FUNCTIONAL
Brand: ACCOLADE™ MRI DR

## 2020-09-01 RX ORDER — ONDANSETRON 2 MG/ML
4 INJECTION INTRAMUSCULAR; INTRAVENOUS EVERY 6 HOURS PRN
Status: DISCONTINUED | OUTPATIENT
Start: 2020-09-01 | End: 2020-09-01 | Stop reason: HOSPADM

## 2020-09-01 RX ORDER — SODIUM CHLORIDE 0.9 % (FLUSH) 0.9 %
3 SYRINGE (ML) INJECTION EVERY 12 HOURS SCHEDULED
Status: DISCONTINUED | OUTPATIENT
Start: 2020-09-01 | End: 2020-09-01 | Stop reason: HOSPADM

## 2020-09-01 RX ORDER — CEFAZOLIN SODIUM 2 G/100ML
2 INJECTION, SOLUTION INTRAVENOUS ONCE
Status: COMPLETED | OUTPATIENT
Start: 2020-09-01 | End: 2020-09-01

## 2020-09-01 RX ORDER — SODIUM CHLORIDE 0.9 % (FLUSH) 0.9 %
10 SYRINGE (ML) INJECTION AS NEEDED
Status: DISCONTINUED | OUTPATIENT
Start: 2020-09-01 | End: 2020-09-01 | Stop reason: HOSPADM

## 2020-09-01 RX ORDER — BUPIVACAINE HYDROCHLORIDE 5 MG/ML
INJECTION, SOLUTION PERINEURAL AS NEEDED
Status: DISCONTINUED | OUTPATIENT
Start: 2020-09-01 | End: 2020-09-01 | Stop reason: HOSPADM

## 2020-09-01 RX ORDER — SODIUM CHLORIDE 9 MG/ML
INJECTION, SOLUTION INTRAVENOUS CONTINUOUS PRN
Status: COMPLETED | OUTPATIENT
Start: 2020-09-01 | End: 2020-09-01

## 2020-09-01 RX ORDER — ACETAMINOPHEN 325 MG/1
650 TABLET ORAL EVERY 6 HOURS
Status: DISCONTINUED | OUTPATIENT
Start: 2020-09-01 | End: 2020-09-02 | Stop reason: HOSPADM

## 2020-09-01 RX ORDER — ONDANSETRON 2 MG/ML
INJECTION INTRAMUSCULAR; INTRAVENOUS AS NEEDED
Status: DISCONTINUED | OUTPATIENT
Start: 2020-09-01 | End: 2020-09-01 | Stop reason: HOSPADM

## 2020-09-01 RX ORDER — LIDOCAINE HYDROCHLORIDE 10 MG/ML
INJECTION, SOLUTION INFILTRATION; PERINEURAL AS NEEDED
Status: DISCONTINUED | OUTPATIENT
Start: 2020-09-01 | End: 2020-09-01 | Stop reason: HOSPADM

## 2020-09-01 RX ORDER — ACETAMINOPHEN 325 MG/1
650 TABLET ORAL EVERY 4 HOURS PRN
Status: DISCONTINUED | OUTPATIENT
Start: 2020-09-01 | End: 2020-09-01 | Stop reason: HOSPADM

## 2020-09-01 RX ORDER — IBUPROFEN 600 MG/1
600 TABLET ORAL EVERY 6 HOURS SCHEDULED
Status: DISCONTINUED | OUTPATIENT
Start: 2020-09-01 | End: 2020-09-02 | Stop reason: HOSPADM

## 2020-09-01 RX ORDER — MIDAZOLAM HYDROCHLORIDE 1 MG/ML
INJECTION INTRAMUSCULAR; INTRAVENOUS AS NEEDED
Status: DISCONTINUED | OUTPATIENT
Start: 2020-09-01 | End: 2020-09-01 | Stop reason: HOSPADM

## 2020-09-01 RX ORDER — NITROGLYCERIN 0.4 MG/1
0.4 TABLET SUBLINGUAL
Status: DISCONTINUED | OUTPATIENT
Start: 2020-09-01 | End: 2020-09-01 | Stop reason: HOSPADM

## 2020-09-01 RX ORDER — SODIUM CHLORIDE 0.9 % (FLUSH) 0.9 %
10 SYRINGE (ML) INJECTION AS NEEDED
Status: DISCONTINUED | OUTPATIENT
Start: 2020-09-01 | End: 2020-09-02 | Stop reason: HOSPADM

## 2020-09-01 RX ORDER — SODIUM CHLORIDE 0.9 % (FLUSH) 0.9 %
3 SYRINGE (ML) INJECTION EVERY 12 HOURS SCHEDULED
Status: DISCONTINUED | OUTPATIENT
Start: 2020-09-01 | End: 2020-09-02 | Stop reason: HOSPADM

## 2020-09-01 RX ORDER — SODIUM CHLORIDE 9 MG/ML
1 INJECTION, SOLUTION INTRAVENOUS CONTINUOUS
Status: ACTIVE | OUTPATIENT
Start: 2020-09-01 | End: 2020-09-01

## 2020-09-01 RX ORDER — FENTANYL CITRATE 50 UG/ML
INJECTION, SOLUTION INTRAMUSCULAR; INTRAVENOUS AS NEEDED
Status: DISCONTINUED | OUTPATIENT
Start: 2020-09-01 | End: 2020-09-01 | Stop reason: HOSPADM

## 2020-09-01 RX ADMIN — ACETAMINOPHEN 650 MG: 325 TABLET, FILM COATED ORAL at 14:44

## 2020-09-01 RX ADMIN — SODIUM CHLORIDE, PRESERVATIVE FREE 3 ML: 5 INJECTION INTRAVENOUS at 17:52

## 2020-09-01 RX ADMIN — SODIUM CHLORIDE 1 ML/KG/HR: 9 INJECTION, SOLUTION INTRAVENOUS at 11:07

## 2020-09-01 RX ADMIN — CEFAZOLIN SODIUM 2 G: 2 INJECTION, SOLUTION INTRAVENOUS at 12:55

## 2020-09-01 RX ADMIN — SODIUM CHLORIDE, PRESERVATIVE FREE 3 ML: 5 INJECTION INTRAVENOUS at 20:51

## 2020-09-01 RX ADMIN — ACETAMINOPHEN 650 MG: 325 TABLET, FILM COATED ORAL at 20:50

## 2020-09-01 RX ADMIN — IBUPROFEN 600 MG: 600 TABLET, FILM COATED ORAL at 15:54

## 2020-09-01 NOTE — H&P
Cardiac Electrophysiology History and Physical          [facility]    History & Physical      PATIENT NAME  Gen Torres    :  1955 AGE: 65 y.o.    ADMISSION DATE: 2020     Subjective      CHIEF COMPLAINT: fatigue, SOA    Problem List:      1. Sinus Node Dysfunction / Chronotropic Incompetence   a. Treadmill Stress 2020 negative for ischemia with 87% maximum predicted HR  2. Premature Ventricular Contractions  a. 7 day Zio 2020 w/ HR , avg 59 bpm and VE burden 9.4% with multifocal PVC's  3. Coronary Artery DIsease  a. Remote myocardial infarction  b. Previous stent   c. CABG x5  [facility]  d. C 2018 w/ PCI to SVC-OM graft  e. ECHO 2019 EF 51-55%, mild-moderate LVH w/ LV segmental hypokinesis, mild MR, LA 5.1 cm, LV 1.4 cm  f. C 10/22/2019 with documented widely patent 5 vessel bypass grafts and stent, EF 50%  4. Chronic Diastolic Congestive Heart Failure  5. Essential Hypertension  6. Diabetes Mellitus  7. KAYLEIGH with CPAP compliance   8. GERD  9. Obesity     HISTORY OF PRESENT ILLNESS:  Mr. Gen Torres is a 65-year-old white male who presents today for elective permanent pacemaker implant for sinus node dysfunction with symptomatic chronotropic incompetence associated with fatigue, shortness of air, and dyspnea on exertion.  Upon presentation today patient reports continued exertional symptomology.  Patient denies palpitations, dizziness, presyncope, or syncope.  Patient's bradycardia not related to identified transient reversible causes.  Patient not on any new heart rate lowering medications.  It is recommended that patient undergo permanent pacemaker implant for his sinus node dysfunction.  All risk, benefits, and alternatives to procedure were outlined reviewed in detail with patient at bedside today.  Patient verbalized complete understanding procedure will proceed as scheduled.  All preprocedure lab evaluation reviewed and  acceptable.  Patient has negative COVID screening documented within the last 72 hours.  Patient denies recent infections or signs of fever, chills, sweats, or cough.  Patient denies sick contacts.      PAST MEDICAL HISTORY  Past Medical History:   Diagnosis Date   • Absent left knee reflex     His wife showed a concern about absent knee reflexes and according to the patient this could have been attributed to his severe back related problem.    • CAD (coronary artery disease)     CABG   • CHF (congestive heart failure) (CMS/[facility])    • Diabetes mellitus (CMS/[facility])     checsk sugar once per week    • ED (erectile dysfunction)     Further history revealed that he also has erectile dysfunction which he attributed may be due to s/p prostatic cancer.   • GERD (gastroesophageal reflux disease)    • Headache    • Hyperlipidemia    • Hypertension    • Lower extremity edema      This seems to be fairly resolved at this point.    • Myocardial infarction (CMS/[facility])     x2 1994 then [id number] and had bypass after    • Neuropathy    • Obesity    • KAYLEIGH on CPAP     cpap- compliant with machine    • Prostate cancer (CMS/[facility])    • SOB (shortness of breath)       During his first visit, the patient mentioned having very occasional shortness of air which he correlates with gaining weight.  This seems to be fairly resolved at this point.    • Wears glasses     readers       SURGICAL HISTORY   has a past surgical history that includes Prostatectomy; Colonoscopy; Coronary artery bypass graft; Cardiac catheterization; Cardiac catheterization (N/A, 5/9/2018); Cardiac catheterization (N/A, 5/9/2018); Cardiac catheterization (N/A, 10/22/2019); Cardiac surgery; Esophagogastroduodenoscopy; and [place] tooth extraction.     SOCIAL HISTORY  Social History     Socioeconomic History   • Marital status:      Spouse name: Not on file   • Number of children: Not on file   • Years of education: Not on file   • Highest education level: Not on file      Tobacco Use   • Smoking status: Former Smoker     Packs/day: 4.00     Years: 26.00     Pack years: 104.00     Types: Cigarettes     Start date: 1968     Last attempt to quit:      Years since quittin.6   • Smokeless tobacco: Never Used   Substance and Sexual Activity   • Alcohol use: No   • Drug use: No   • Sexual activity: Defer     Partners: Female   Social History Narrative    Caffeine use: 2 servings daily.       FAMILY HISTORY  family history includes Cancer in his father and mother; Diabetes in his sister; Heart attack in his father; Heart disease in his brother, father, and sister.     MEDICATIONS  Prior to Admission medications    Medication Sig Start Date End Date Taking? Authorizing Provider   aspirin 81 MG tablet Take 1 tablet by mouth Daily.  Patient taking differently: Take 81 mg by mouth Every Night. 17   Hari Vargas MD   atorvastatin (LIPITOR) 80 MG tablet TAKE 1 TABLET BY MOUTH ONCE DAILY  Patient taking differently: Take 80 mg by mouth Daily. 20   Hari Vargas MD   carvedilol (COREG) 12.5 MG tablet take 1 tablet by mouth twice a day with food  Patient taking differently: Take 12.5 mg by mouth 2 (Two) Times a Day With Meals. 20   Hari Vargas MD   DULoxetine (CYMBALTA) 60 MG capsule Take 60 mg by mouth Daily. 18   Aislinn Pace MD   fexofenadine (ALLEGRA) 180 MG tablet Take 180 mg by mouth Daily.    Aislinn Pace MD   furosemide (LASIX) 20 MG tablet Take 1 tablet by mouth Daily As Needed (Swelling).  Patient taking differently: Take 20 mg by mouth As Needed (Swelling). 18   Hari Vargas MD   losartan (COZAAR) 100 MG tablet Take 1 tablet by mouth Daily. 20   Hari Vargas MD   metFORMIN (GLUCOPHAGE) 500 MG tablet Take 1 tablet by mouth 2 (Two) Times a Day. 19   Aislinn Pace MD   omeprazole (priLOSEC) 20 MG capsule Take 20 mg by mouth Daily.    Aislinn Pace MD   potassium chloride (K-TAB) 20 MEQ tablet  "controlled-release ER tablet Take 1 tablet by mouth Daily. When taking Lasix. 7/6/20   Hari Vargas MD   TRESIBA FLEXTOUCH 200 UNIT/ML solution pen-injector Inject 50 Units under the skin into the appropriate area as directed Every Night. 7/3/17   Provider, MD Aislinn       ALLERGIES  Allergies   Allergen Reactions   • Contrast Dye Shortness Of Breath     Hives, itching   • Iodine Itching and Rash   • Spironolactone Diarrhea     He was not able to tolerate the spironolactone secondary to diarrhea.       REVIEW OF SYSTEMS  Constitutional: No fevers or chills, no recent weight gain or weight loss, + fatigue  Eyes: No visual loss, blurred vision, double vision, yellow sclerae.  ENT: No headaches, hearing loss, vertigo, congestion or sore throat.   Cardiovascular: Per HPI  Respiratory: No cough or wheezing, no sputum production, no hematemesis, + soa, toro   Gastrointestinal: No abdominal pain, no nausea, vomiting, constipation, diarrhea, melena.   Genitourinary: No dysuria, hematuria or increased frequency.  Musculoskeletal:  No gait disturbance, weakness or joint pain or stiffness  Integumentary: No rashes, urticaria, ulcers or sores.   Neurological: No headache, dizziness, syncope, paralysis, ataxia, no prior CVA/TIA  Psychiatric: No anxiety, or depression  Endocrine: No diaphoresis, cold or heat intolerance. No polyuria or polydipsia.   Hematologic/Lymphatic: No anemia, abnormal bruising or bleeding. No history of DVT/PE.      Objective      PHYSICAL EXAM    Vital Signs: /89 (BP Location: Left arm, Patient Position: Lying)   Pulse (!) 49   Temp 98 °F (36.7 °C) (Temporal)   Ht 177.8 cm (70\")   Wt 114 kg (251 lb 15.8 oz)   SpO2 98%   BMI 36.16 kg/m²      General appearance: Awake, alert, cooperative  Head: Normocephalic, without obvious abnormality, atraumatic  Eyes: Conjunctivae/corneas clear, EOM's intact  Neck: no adenopathy, no carotid bruit, no JVD and thyroid: not enlarged  Lungs: clear to " "auscultation bilaterally and no rhonchi or crackles\", ' symmetric  Heart: regular rate and rhythm, S1, S2 normal, no murmur, click, rub or gallop  Abdomen: Soft, non-tender. Bowel sounds normal,  no organomegaly  Extremities: extremities normal, atraumatic, no cyanosis or edema  Skin: Skin color, turgor normal, no rashes or lesions  Neurologic: Grossly normal       CBC: WBC   Date Value Ref Range Status   08/30/2020 6.25 3.40 - 10.80 10*3/mm3 Final     RBC   Date Value Ref Range Status   08/30/2020 4.31 4.14 - 5.80 10*6/mm3 Final     Hemoglobin   Date Value Ref Range Status   08/30/2020 14.0 13.0 - 17.7 g/dL Final     Hematocrit   Date Value Ref Range Status   08/30/2020 40.8 37.5 - 51.0 % Final     MCV   Date Value Ref Range Status   08/30/2020 94.7 79.0 - 97.0 fL Final     RDW   Date Value Ref Range Status   08/30/2020 13.2 12.3 - 15.4 % Final     Platelets   Date Value Ref Range Status   08/30/2020 122 (L) 140 - 450 10*3/mm3 Final     Lab Results   Component Value Date    GLUCOSE 141 (H) 09/01/2020    CALCIUM 9.0 09/01/2020     09/01/2020    K 4.2 09/01/2020    CO2 25.0 09/01/2020     09/01/2020    BUN 14 09/01/2020    CREATININE 0.97 09/01/2020    EGFRIFNONA 78 09/01/2020    BCR 14.4 09/01/2020    ANIONGAP 10.0 09/01/2020         CURRENT MEDICATIONS:  )  ceFAZolin 2 g Intravenous Once   sodium chloride 3 mL Intravenous Q12H     CONTINUOUS INFUSIONS:    sodium chloride 1 mL/kg/hr           TELEMETRY: SB 55 bpm    Assessment & Plan     Mr. Gen Torres is a 65-year-old white male who presents today for elective permanent pacemaker implant for sinus node dysfunction with symptomatic chronotropic incompetence associated with fatigue, shortness of air, and dyspnea on exertion.  Patient's bradycardia not related to identified transient reversible causes.  Patient not on any new heart rate lowering medications.  It is recommended that patient undergo permanent pacemaker implant for his sinus node " dysfunction.  All risk, benefits, and alternatives to procedure were outlined reviewed in detail with patient at bedside today.  Patient verbalized complete understanding procedure will proceed as scheduled.  All preprocedure lab evaluation reviewed and acceptable.  Patient has negative COVID screening documented within the last 72 hours.  Patient denies recent infections or signs of fever, chills, sweats, or cough.  Patient denies sick contacts.      Electronically signed by GEOFF García, 09/01/20, 10:55 AM.      This note was completed using a voice transcription system. Every effort was made to ensure accuracy. However, inadvertent computerized transcription errors may be present.

## 2020-09-01 NOTE — OP NOTE
Cardiac Electrophysiology Procedure Note       [facility]    PROCEDURE: PERMANENT PACEMAKER    OPERATION PERFORMED:     Implantation of BSCI L311 model, [id number] serial number pulse generator at the left prepectoral site.    Atrial lead BSCI 7841, 52 cm, serial number [id number].    Right ventricular lead BSCI 7842, 59 cm, serial number [id number].     ATTENDING SURGEON: Sandeep Marina,      MODERATE SEDATION FOR PROCEDURE:    Moderate sedation was given during this procedure.    I supervised and directed Rahel Stanton RN to administer this sedation.  This staff member also monitored the patient's hemodynamic and respiratory status and response to these medications.  Please see the full detailed procedure report generated by the electrophysiology laboratory staff.  The patient tolerated moderate sedation well.  There were no complications regarding sedation.  The total dose of fentanyl was 150 mcg and the total dose of midazolam was 4 mg.  The total dose of Brevital was 100 mg.  First sedation was administered at 1259 and continued through 1350.    ESTIMATED BLOOD LOSS: less than 20cc    RADIATION EXPOSURE: 0.8 minutes and 3 milliGray.    COMPLICATIONS: None.    TIME OUT: Time out was completed with verification of the correct patient identity, procedure to be performed, procedure site and implanted equipment.    INDICATION FOR PROCEDURE:  Briefly, Gen Torres is a 65 y.o. year old male with a history of symptomatic sinus node dysfunction.     PROCEDURE AND FINDINGS:  The patient was brought to the electrophysiology laboratory in a post absorptive state.  Informed consent was given by the patient prior to the procedure and confirmed.  Intravenous prophylactic antibiotics were administered prior to the procedure.  After the site of implantation was prepped and draped in the usual sterile fashion and after adequate anesthesia was given, the skin was infiltrated with 1% lidocaine  and 0.5% bupivicaine 50/50 mixture.  The skin was incised with a #10 scalpel.  Blunt and electrosurgical dissection was carried out to the level of the pre pectoral fascia with careful attention paid to hemostasis.  A pocket to house the pulse generator was formed between the pre pectoral fascia and subcutaneous fat with blunt and electrosurgical dissection.  The pocket was copiously irrigated with antibiotic containing normal saline and subsequently observed. Venous access was obtained via cephalic vein cut down.  This was performed via direct visualization.  After dissection down to the fascia of the pectoralis major muscle, the deltopectoral groove was identified.  The fat lying between the pectoralis major and deltoid muscles was identified.  I used Metzenbaum and electrosurgery to dissect posteriorly through the deltopectoral groove.  A cephalic vein was identified here.  The vein was isolated and cannulated with a direct surgical cut down technique.  A 0.035 inch J tip wire was introduced into the vein and passed through the superior vena cava, then right atrium and then to the inferior vena cava, excluding inadvertent arterial access.  A short peel away sheath was then placed over this wire into the vascular system.  The cephalic vein was then ligatted medially and laterally to the vascular insertion point of the sheath.      RIGHT VENTRICULAR LEAD:    A peel away sheath was brought to the field and placed into the venous system via over the wire technique.  The right ventricular lead was placed via this sheath into the right ventricle. The lead was attached via active fixation.  Adequate sensing and threshold parameters were obtained.  There was no evidence of diaphragmatic stimulation at 10 V output.  The peel away sheath was removed and the lead collar was advanced to the pectoral muscle and sutured with non absorbable suture.  Tug testing of this lead confirmed stability of the lead and the length of the  lead's slack was assessed as optimal with fluoroscopy.     RIGHT ATRIAL LEAD:    A peel away sheath was brought to the field and placed into the venous system via over the wire technique.  The right atrial lead was placed via this sheath into the right atrium. The lead was attached via active fixation.  Adequate sensing and threshold parameters were obtained.  There was no evidence of diaphragmatic stimulation at 10 V output.  The peel away sheath was removed and the lead collar was advanced to the pectoral muscle and sutured with non absorbable suture.  Tug testing of this lead confirmed stability of the lead and the length of the lead's slack was assessed as optimal with fluoroscopy.     The pulse generator was then sutured to the fascia in a medial location within the pocket using 1-0 silk suture.  The pocket was closed with two layers of suture using 2-0 then 3-0 Vicryl, followed by a layer of surgical adhesive and finally a sterile occlusive dressing.    MEASURED DEVICE DATA:    Atrial lead                   sensing:                  3 mV                   impedance:            624 Ohms                   Threshold:             0.6 Volts at 0.4 ms    RV lead                    sensin.5 mV                   impedance:            807 Ohms                   Threshold:             0.6 Volts at 0.4 ms                     PROGRAMMED PARAMETERS:    Mode:                      DDDR   Lower Rate:                60 pulses per minute  Upper Sensor Rate:         130 pulses per minute  Upper Tracking Rate:       130 pulses per minute  Mode Switch Rate:          170 bpm    CONCLUSION:  Successful implantation of permanent pacemaker system.      RECOMMENDATION:    Patient is to follow up with our clinic in approximately one week and then myself in 3 months.    No lovenox or heparin at any dose is to be given.      FOR THE PATIENT    Please do not lift more than 10 pounds or abduct the shoulder joint / or  raise the arm above the shoulder for 6 weeks after device was implanted (this does not apply to subcutaneous ICDs).    Avoid activities that involve heavy lifting or rough contact that could result in blows to your implant site and to allow your incision time to heal.    No shower or getting device incision wet for 2 days post-operatively.    Keep wound exposed to air unless otherwise instructed, the surgical glue is the bandage.    No creams, lotions, or powders to incision.    Please avoid allowing bra strap or suspenders to lay over incision until completely healed.    Avoid hot tubs or pools until incision completely healed.    No driving for 24 hours post-operatively after device implant.    Call your doctor if you have any swelling, redness or discharge around your incision, notice anything unusual or unexpected, or you develop a fever that does not go away in two or three days.    Call your doctor if you hear any beeping sounds / vibratory alerts from your device as this indicates your device needs to be checked immediately.    Carry your Medical Device ID Card with you at all times.  Please call our office () with any questions about the device or the wounds.          Sandeep Marina, DO, FACC, RS  Cardiac Electrophysiologist  [facility] / [facility]

## 2020-09-01 NOTE — PLAN OF CARE
Patient resting at this time, arm sling to left arm, pacemaker site clean, dry and no redness noted, patient to go home tomorrow after Dr. Marina removes bandage. No other complaints at this time, VSS.     Problem: Patient Care Overview  Goal: Plan of Care Review  Outcome: Ongoing (interventions implemented as appropriate)  Goal: Individualization and Mutuality  Outcome: Ongoing (interventions implemented as appropriate)  Goal: Discharge Needs Assessment  Outcome: Ongoing (interventions implemented as appropriate)  Goal: Interprofessional Rounds/Family Conf  Outcome: Ongoing (interventions implemented as appropriate)     Problem: Arrhythmia/Dysrhythmia (Symptomatic) (Adult)  Goal: Signs and Symptoms of Listed Potential Problems Will be Absent, Minimized or Managed (Arrhythmia/Dysrhythmia)  Outcome: Ongoing (interventions implemented as appropriate)

## 2020-09-01 NOTE — DISCHARGE INSTRUCTIONS
FOR THE PATIENT     Please do not lift more than 10 pounds or abduct the shoulder joint / or raise the arm above the shoulder for 6 weeks after device was implanted (this does not apply to subcutaneous ICDs).     Avoid activities that involve heavy lifting or rough contact that could result in blows to your implant site and to allow your incision time to heal.     No shower or getting device incision wet for 2 days post-operatively.     Keep wound exposed to air unless otherwise instructed, the surgical glue is the bandage.     No creams, lotions, or powders to incision.     Please avoid allowing bra strap or suspenders to lay over incision until completely healed.     Avoid hot tubs or pools until incision completely healed.     No driving for 24 hours post-operatively after device implant.     Call your doctor if you have any swelling, redness or discharge around your incision, notice anything unusual or unexpected, or you develop a fever that does not go away in two or three days.     Call your doctor if you hear any beeping sounds / vibratory alerts from your device as this indicates your device needs to be checked immediately.     Carry your Medical Device ID Card with you at all times.  Please call our office () with any questions about the device or the wounds.

## 2020-09-02 VITALS
SYSTOLIC BLOOD PRESSURE: 131 MMHG | TEMPERATURE: 98.3 F | BODY MASS INDEX: 36.07 KG/M2 | OXYGEN SATURATION: 96 % | RESPIRATION RATE: 18 BRPM | HEART RATE: 60 BPM | DIASTOLIC BLOOD PRESSURE: 84 MMHG | WEIGHT: 251.99 LBS | HEIGHT: 70 IN

## 2020-09-02 PROCEDURE — 94799 UNLISTED PULMONARY SVC/PX: CPT

## 2020-09-02 PROCEDURE — 99024 POSTOP FOLLOW-UP VISIT: CPT | Performed by: NURSE PRACTITIONER

## 2020-09-02 PROCEDURE — 94660 CPAP INITIATION&MGMT: CPT

## 2020-09-02 RX ORDER — ACETAMINOPHEN 325 MG/1
650 TABLET ORAL EVERY 6 HOURS
Qty: 32 TABLET | Refills: 0 | Status: SHIPPED | OUTPATIENT
Start: 2020-09-02 | End: 2020-09-06

## 2020-09-02 RX ORDER — IBUPROFEN 600 MG/1
600 TABLET ORAL EVERY 6 HOURS SCHEDULED
Qty: 17 TABLET | Refills: 0 | Status: SHIPPED | OUTPATIENT
Start: 2020-09-02 | End: 2020-09-07

## 2020-09-02 RX ADMIN — IBUPROFEN 600 MG: 600 TABLET, FILM COATED ORAL at 01:50

## 2020-09-02 RX ADMIN — SODIUM CHLORIDE, PRESERVATIVE FREE 3 ML: 5 INJECTION INTRAVENOUS at 09:08

## 2020-09-02 RX ADMIN — ACETAMINOPHEN 650 MG: 325 TABLET, FILM COATED ORAL at 01:50

## 2020-09-02 RX ADMIN — ACETAMINOPHEN 650 MG: 325 TABLET, FILM COATED ORAL at 09:08

## 2020-09-02 RX ADMIN — IBUPROFEN 600 MG: 600 TABLET, FILM COATED ORAL at 06:00

## 2020-09-02 NOTE — PLAN OF CARE
Patient up in chair, in NAD, VSS, going home today.     Problem: Patient Care Overview  Goal: Plan of Care Review  Outcome: Ongoing (interventions implemented as appropriate)  Goal: Individualization and Mutuality  Outcome: Ongoing (interventions implemented as appropriate)  Goal: Discharge Needs Assessment  Outcome: Ongoing (interventions implemented as appropriate)  Goal: Interprofessional Rounds/Family Conf  Outcome: Ongoing (interventions implemented as appropriate)     Problem: Arrhythmia/Dysrhythmia (Symptomatic) (Adult)  Goal: Signs and Symptoms of Listed Potential Problems Will be Absent, Minimized or Managed (Arrhythmia/Dysrhythmia)  Outcome: Ongoing (interventions implemented as appropriate)

## 2020-09-02 NOTE — PROGRESS NOTES
Continued Stay Note  [facility]     Patient Name: Gen Torres  MRN: [id number]  Today's Date: 9/2/2020    Admit Date: 9/1/2020    Discharge Plan     Row Name 09/02/20 1029       Plan    Plan  Home at DC    Patient/Family in Agreement with Plan  other (see comments)    Plan Comments  The pt has been DCed. No DC needs identified.    Row Name 09/02/20 0843       Plan    Final Discharge Disposition Code  01 - home or self-care        Discharge Codes    No documentation.       Expected Discharge Date and Time     Expected Discharge Date Expected Discharge Time    Sep 2, 2020             Gris Saleh RN

## 2020-09-02 NOTE — PROGRESS NOTES
"      Cardiac Electrophysiology Inpatient Follow Up Note         [facility]    Progress Note      CC: Sinus node dysfunction    Subjective      Mr. Gen Torres is a 65-year-old white male seen in EP follow-up today status post dual-chamber permanent pacemaker implant for sinus node dysfunction.  Upon evaluation patient is resting comfortably in recliner at bedside without complaints.  Patient's pacemaker incision site is assessed with occlusive Dermabond covering and without hematoma, ecchymosis, or drainage noted.  Patient has been up walking in room and voiding without difficulty.  Patient states he is ready for discharge home today as scheduled.      REVIEW OF SYSTEMS  ROS no change from admission H&P except for: above    Objective   VITAL SIGNS  /84 (BP Location: Right arm, Patient Position: Lying)   Pulse 60   Temp 98.3 °F (36.8 °C) (Oral)   Resp 18   Ht 177.8 cm (70\")   Wt 114 kg (251 lb 15.8 oz)   SpO2 96%   BMI 36.16 kg/m²     Pulse Ox: SpO2  Av.2 %  Min: 93 %  Max: 100 %  Supplemental O2:       Admit Weight  Weight: 114 kg (251 lb 15.8 oz)  Last 3 Weights    Body mass index is 36.16 kg/m².    INTAKE/OUTPUT  I/O last 3 completed shifts:  In: 500 [I.V.:500]  Out: -     Intake/Output Summary (Last 24 hours) at 2020 0941  Last data filed at 2020 1349  Gross per 24 hour   Intake 500 ml   Output --   Net 500 ml       PHYSICAL EXAM  General appearance: awake, alert, oriented, moves all extremities  Lungs: no rhonchi, no wheezes, no rales  Heart: S1-S2, RRR  Abdomen: positive bowel sounds, no bruits, no masses  Extremities: warm and dry, no cyanosis, no clubbing    LABS  Results from last 7 days   Lab Units 20  1248   WBC 10*3/mm3 6.25   HEMOGLOBIN g/dL 14.0   HEMATOCRIT % 40.8   MCV fL 94.7   PLATELETS 10*3/mm3 122*         Results from last 7 days   Lab Units 20  1019   POTASSIUM mmol/L 4.2   CHLORIDE mmol/L 106   CO2 mmol/L 25.0   BUN " mg/dL 14   CREATININE mg/dL 0.97   GLUCOSE mg/dL 141*   CALCIUM mg/dL 9.0       CURRENT MEDICATIONS    acetaminophen 650 mg Oral Q6H   ibuprofen 600 mg Oral Q6H   sodium chloride 3 mL Intravenous Q12H       CXR: Device and leads in acceptable position without pneumothorax noted    TELEMETRY: Paced 60 bpm    Assessment & Plan     Mr. Gen Torres is a 65-year-old white male seen in EP follow-up today status post dual-chamber permanent pacemaker implant for sinus node dysfunction.  Patient is stable and ready for discharge home today with follow-up in 1 week for wound check and in 3 months with Dr. Marina in office and [employer] device check.  Patient is instructed on all activity restrictions and wound care instructions.  Patient's medications are reviewed and outlined on discharge paperwork.  Patient verbalized complete understanding above instruction and education is ready for discharge home today.    Electronically signed by GEOFF García, 09/02/20, 9:41 AM.

## 2020-09-02 NOTE — PLAN OF CARE
VSS. Room air. No complaints of pain. Pacemaker site clean/dry.  Problem: Patient Care Overview  Goal: Plan of Care Review  Outcome: Ongoing (interventions implemented as appropriate)  Goal: Individualization and Mutuality  Outcome: Ongoing (interventions implemented as appropriate)  Goal: Discharge Needs Assessment  Outcome: Ongoing (interventions implemented as appropriate)  Goal: Interprofessional Rounds/Family Conf  Outcome: Ongoing (interventions implemented as appropriate)     Problem: Arrhythmia/Dysrhythmia (Symptomatic) (Adult)  Goal: Signs and Symptoms of Listed Potential Problems Will be Absent, Minimized or Managed (Arrhythmia/Dysrhythmia)  Outcome: Ongoing (interventions implemented as appropriate)

## 2020-09-08 ENCOUNTER — OFFICE VISIT (OUTPATIENT)
Dept: CARDIOLOGY | Facility: CLINIC | Age: 65
End: 2020-09-08

## 2020-09-08 DIAGNOSIS — I49.5 SINUS NODE DYSFUNCTION (HCC): Primary | ICD-10-CM

## 2020-09-08 PROCEDURE — 99024 POSTOP FOLLOW-UP VISIT: CPT | Performed by: INTERNAL MEDICINE

## 2020-09-08 NOTE — PROGRESS NOTES
09/08/2020    Name: Gen Torres  YOB: 1955    WOUND CHECK    Patient has fever: [] YES   [] NO     Temperature if indicated:     Wound Location:  Left Shoulder     Surgical Glue: intact     Wound Appearance: clear/intact     Plan: normal wound check      Did patient receive home monitoring box? [x] YES   [] NO  (If no, contact device clinic.)    Does patient have questions about remote monitoring? [] YES   [x] NO (If yes notify device clinic)      Notes:       Appointment for follow-up scheduled for 3 months post procedure [x]    Future Appointments   Date Time Provider Department Center   12/7/2020 10:00 AM Sandeep Marina DO [facility] None          Carolina Thompson MA, 09/08/20

## 2020-10-01 ENCOUNTER — TELEPHONE (OUTPATIENT)
Dept: CARDIOLOGY | Facility: CLINIC | Age: 65
End: 2020-10-01

## 2020-10-01 NOTE — TELEPHONE ENCOUNTER
Called pt to see if he has set up his [employer] home monitor.  No readings received.  Spoke to wife and they have not plugged up monitor yet.  They will do so tonight and they will call tomorrow to see if receiving readings.

## 2020-12-03 ENCOUNTER — TELEPHONE (OUTPATIENT)
Dept: CARDIOLOGY | Facility: CLINIC | Age: 65
End: 2020-12-03

## 2020-12-03 NOTE — TELEPHONE ENCOUNTER
>>Pt’s spouse called the AM to report difficulty sending in remote device transmission. Pt’s spouse states they live in a remote location with poor WiFi service and cell phone service.     >>Advised spouse to call [employer] customer service for more in-depth assistance with remote monitor set-up; provided her the phone number. Spouse verbalized understanding.

## 2021-06-10 ENCOUNTER — TELEPHONE (OUTPATIENT)
Dept: CARDIOLOGY | Facility: CLINIC | Age: 66
End: 2021-06-10

## 2021-06-10 NOTE — TELEPHONE ENCOUNTER
Patient is to have 3 teeth extracted by Dr. Serjio Caballero. Patient is needing pre-procedure cardiac risk assessment as requested by oral surgeon.       Patient has no current cardiac complaints. Please advise.

## 2021-06-11 NOTE — TELEPHONE ENCOUNTER
Left voicemail with patient regarding recommendations. Requested return call with any further questions.

## 2021-08-30 ENCOUNTER — IMMUNIZATION (OUTPATIENT)
Dept: VACCINE CLINIC | Facility: HOSPITAL | Age: 66
End: 2021-08-30

## 2021-08-30 PROCEDURE — 91300 HC SARSCOV02 VAC 30MCG/0.3ML IM: CPT | Performed by: INTERNAL MEDICINE

## 2021-08-30 PROCEDURE — 0001A: CPT | Performed by: INTERNAL MEDICINE

## 2021-09-20 ENCOUNTER — IMMUNIZATION (OUTPATIENT)
Dept: VACCINE CLINIC | Facility: HOSPITAL | Age: 66
End: 2021-09-20

## 2021-09-20 PROCEDURE — 91300 HC SARSCOV02 VAC 30MCG/0.3ML IM: CPT | Performed by: INTERNAL MEDICINE

## 2021-09-20 PROCEDURE — 0002A: CPT | Performed by: INTERNAL MEDICINE

## 2021-12-06 ENCOUNTER — OFFICE VISIT (OUTPATIENT)
Dept: CARDIOLOGY | Facility: CLINIC | Age: 66
End: 2021-12-06

## 2021-12-06 VITALS
HEART RATE: 60 BPM | OXYGEN SATURATION: 98 % | WEIGHT: 249 LBS | BODY MASS INDEX: 35.65 KG/M2 | DIASTOLIC BLOOD PRESSURE: 84 MMHG | SYSTOLIC BLOOD PRESSURE: 132 MMHG | HEIGHT: 70 IN

## 2021-12-06 DIAGNOSIS — I49.3 PVC (PREMATURE VENTRICULAR CONTRACTION): ICD-10-CM

## 2021-12-06 DIAGNOSIS — I25.118 CORONARY ARTERY DISEASE OF NATIVE ARTERY OF NATIVE HEART WITH STABLE ANGINA PECTORIS (HCC): Primary | ICD-10-CM

## 2021-12-06 DIAGNOSIS — I10 ESSENTIAL HYPERTENSION: ICD-10-CM

## 2021-12-06 DIAGNOSIS — E78.2 MIXED HYPERLIPIDEMIA: ICD-10-CM

## 2021-12-06 DIAGNOSIS — I49.5 SINUS NODE DYSFUNCTION (HCC): ICD-10-CM

## 2021-12-06 DIAGNOSIS — Z00.00 HEALTHCARE MAINTENANCE: ICD-10-CM

## 2021-12-06 DIAGNOSIS — I49.5 CHRONOTROPIC INCOMPETENCE WITH SINUS NODE DYSFUNCTION (HCC): ICD-10-CM

## 2021-12-06 PROCEDURE — 93280 PM DEVICE PROGR EVAL DUAL: CPT | Performed by: INTERNAL MEDICINE

## 2021-12-06 PROCEDURE — 99214 OFFICE O/P EST MOD 30 MIN: CPT | Performed by: INTERNAL MEDICINE

## 2021-12-06 RX ORDER — POTASSIUM CHLORIDE 1500 MG/1
20 TABLET, FILM COATED, EXTENDED RELEASE ORAL DAILY
Qty: 30 TABLET | Refills: 3 | Status: SHIPPED | OUTPATIENT
Start: 2021-12-06 | End: 2022-04-01

## 2021-12-06 RX ORDER — ATORVASTATIN CALCIUM 80 MG/1
80 TABLET, FILM COATED ORAL NIGHTLY
Qty: 90 TABLET | Refills: 3 | Status: SHIPPED | OUTPATIENT
Start: 2021-12-06

## 2021-12-06 RX ORDER — FUROSEMIDE 20 MG/1
20 TABLET ORAL DAILY PRN
Qty: 60 TABLET | Refills: 3 | Status: SHIPPED | OUTPATIENT
Start: 2021-12-06 | End: 2022-07-26

## 2021-12-06 RX ORDER — CARVEDILOL 12.5 MG/1
12.5 TABLET ORAL 2 TIMES DAILY WITH MEALS
Qty: 180 TABLET | Refills: 3 | Status: SHIPPED | OUTPATIENT
Start: 2021-12-06

## 2021-12-06 RX ORDER — LOSARTAN POTASSIUM 100 MG/1
100 TABLET ORAL NIGHTLY
Qty: 90 TABLET | Refills: 3 | Status: SHIPPED | OUTPATIENT
Start: 2021-12-06

## 2021-12-06 NOTE — PROGRESS NOTES
[facility]   [address] [place] [address], [address]  [place], [place], [id number] [phone]  [email or web address]           OUTPATIENT CLINIC FOLLOW UP NOTE    Patient Care Team:  Patient Care Team:  Wendy Allen APRN as PCP - General    Subjective:   Reason for consultation:   Chief Complaint   Patient presents with   • Coronary artery disease of native artery of native heart wit       HPI:    Gen Torres is a 66 y.o. male.  Cardiac problem list:  1. Multivessel CAD   1. Remote stenting  2. Status post 5 vessel bypass (LIMA to LAD and diag, SVG to PDA, SVG to OM )  3. ANNA SVG to OM, 2018  4. No new obstructive disease on [facility] 2019  2. Chronic diastolic heart failure  3. Sick sinus syndrome status post DC PPM, [facility], Dr. Marina, 8/2020  4. PVCs  1. 9% burden on heart monitor 2/2020  5. Persistent shortness of breath of unknown etiology  1. Patent bypass grafts on heart cath 10/2019  2. Unremarkable chest x-ray, PFTs 11/2019  6. Relative bradycardia  7. Essential hypertension  8. Mixed hyperlipidemia  9. Diabetes.      He presents today for follow-up.    Since his pacemaker implant, he has not followed up.  Did not return for a wound check and had not been able to set up remote interrogations due to lack of Internet/cellular signal near his home.  Despite this, he has done well clinically.  Denies chest pain, palpitations.  Tolerating his medicines without difficulty.  Has mild stable chronic dyspnea that has not changed or worsened.    Review of Systems:  Positive for shortness of breath  Negative for exertional chest pain, palpitations, lightheadedness, syncope.     PFSH:  Patient Active Problem List   Diagnosis   • Obesity   • Diabetes mellitus (HCC)   • Mixed hyperlipidemia   • Essential hypertension   • KAYLEIGH on CPAP   • ED (erectile dysfunction)   • Chronic diastolic heart failure (HCC)   • Coronary artery disease of native artery of native heart with stable angina pectoris  (HCC)   • Dyspnea on exertion   • Fatigue   • PVC (premature ventricular contraction)   • Chronotropic incompetence with sinus node dysfunction (HCC)         Current Outpatient Medications:   •  aspirin 81 MG tablet, Take 1 tablet by mouth Daily. (Patient taking differently: Take 81 mg by mouth Every Night.), Disp: 90 tablet, Rfl: 3  •  atorvastatin (LIPITOR) 80 MG tablet, Take 1 tablet by mouth Every Night., Disp: 90 tablet, Rfl: 3  •  carvedilol (COREG) 12.5 MG tablet, Take 1 tablet by mouth 2 (Two) Times a Day With Meals., Disp: 180 tablet, Rfl: 3  •  DULoxetine (CYMBALTA) 60 MG capsule, Take 60 mg by mouth Daily., Disp: , Rfl: 0  •  fexofenadine (ALLEGRA) 180 MG tablet, Take 180 mg by mouth Daily., Disp: , Rfl:   •  furosemide (LASIX) 20 MG tablet, Take 1 tablet by mouth Daily As Needed (Swelling)., Disp: 60 tablet, Rfl: 3  •  losartan (COZAAR) 100 MG tablet, Take 1 tablet by mouth Every Night., Disp: 90 tablet, Rfl: 3  •  metFORMIN (GLUCOPHAGE) 500 MG tablet, Take 1 tablet by mouth 2 (Two) Times a Day., Disp: , Rfl: 0  •  omeprazole (priLOSEC) 20 MG capsule, Take 20 mg by mouth Daily., Disp: , Rfl:   •  potassium chloride ER (K-TAB) 20 MEQ tablet controlled-release ER tablet, Take 1 tablet by mouth Daily. When taking Lasix., Disp: 30 tablet, Rfl: 3  •  TRESIBA FLEXTOUCH 200 UNIT/ML solution pen-injector, Inject 50 Units under the skin into the appropriate area as directed Every Night., Disp: , Rfl: 0    Allergies   Allergen Reactions   • Contrast Dye Shortness Of Breath     Hives, itching   • Iodine Itching and Rash   • Spironolactone Diarrhea     He was not able to tolerate the spironolactone secondary to diarrhea.        reports that he quit smoking about 27 years ago. His smoking use included cigarettes. He started smoking about 53 years ago. He has a 104.00 pack-year smoking history. He has never used smokeless tobacco.    Family History   Problem Relation Age of Onset   • Heart attack Father    • Heart  "disease Father    • Cancer Father    • Cancer Mother    • Heart disease Sister    • Diabetes Sister    • Heart disease Brother          Objective:   Blood pressure 132/84, pulse 60, height 177.8 cm (70\"), weight 113 kg (249 lb), SpO2 98 %.  CONSTITUTIONAL: No acute distress, normal affect  RESPIRATORY: Normal effort. Clear to auscultation bilaterally without wheezing or rales  CARDIOVASCULAR: Regular rate and rhythm with normal S1 and S2. Without gallop or rub.  Systolic ejection murmur at the right upper sternal border, no radiation  PERIPHERAL VASCULAR: Normal radial pulses bilaterally. There is no peripheral edema bilaterally.    Labs:  Lab Results   Component Value Date    ALT 31 05/08/2018    AST 21 05/08/2018     Lab Results   Component Value Date    CHOL 171 10/22/2019    TRIG 115 10/22/2019    HDL 31 (L) 10/22/2019    CREATININE 0.97 09/01/2020       Diagnostic Data:    Procedures    ZIO heart monitor 2/2020: PVC burden 9%, symptomatic PVCs on occasion    [facility] 5/2018  · There was severe multivessel coronary artery disease involving a 80% diffuse proximal LAD stenosis, 100%  of the left circumflex artery, 100%  of the right coronary artery.  There is a patent sequential LIMA to diagonal branch and LAD, patent sequential SVG to a diagonal branch and to a large OM, and a patent SVG to the RPDA.  · There was an 80% discrete stenosis just distal to the SVG to OM anastomosis that was found to be functionally significant with an iFR ratio 0.87.  This lesion is now status post intervention with a Xience Alpine 2.75 x 12 mm drug-eluting stent.    Stress test 10/2019  · Left ventricular ejection fraction is normal (Calculated EF = 51%).  · There were occasional PVCs throughout the study.  · Myocardial perfusion imaging indicates a large-sized infarct located in the lateral wall and inferior wall with mild ivonne-infarct ischemia.  · Impressions are consistent with an intermediate risk study.    [facility] 10/2019  · There " is severe multivessel coronary artery disease with widely patent 5 vessel bypass as well as a previously placed stent  · There is no new significant change in the coronary anatomy when compared to the postintervention angiographic images from May 9, 2018  · Low normal left ventricular ejection fraction 50%      Results for orders placed during the hospital encounter of 09/30/19    Adult Transthoracic Echo Complete W/ Cont if Necessary Per Protocol    Interpretation Summary  · Left ventricular systolic function is normal. Estimated EF appears to be in the range of 51 - 55%.  · Left ventricular wall thickness is consistent with mild-to-moderate concentric hypertrophy.  · The following left ventricular wall segments are hypokinetic: mid inferolateral, apical inferior and mid inferior.  · Left ventricular diastolic dysfunction (grade I a) consistent with impaired relaxation.  · Left atrial cavity size is moderately dilated.  · Mild mitral valve regurgitation is present    DEVICE INTERROGATION: [facility] [place] [place] PPM, Interrogation date 12/6/2021-   RA pacing 68%, RV pacing <1%. Threshold and impedances are acceptable.  Heart rate set at 60.  RA and RV outputs decreased to 2.0 the at 0.5.  Battery voltage is 9 years.      Assessment and Plan:     Coronary artery disease involving native coronary artery of native heart without angina pectoris  Mixed hyperlipidemia  -CCS class 0  -Continue aspirin, carvedilol, atorvastatin  -Repeat CMP and lipid panel    Chronic diastolic heart failure  Lower extremity swelling  -EF 55% 5/2018  -Continue beta-blocker and losartan  -Continue Lasix as needed, to be taken with concomitant potassium     PVCs, 9% burden  Sick sinus syndrome status post pacemaker  -Has not followed up with electrophysiology since implant  -Acceptable interrogation today.  Outputs decreased.  Patient hopes to have access to Internet at his home in the next few months.  Asked him to call our office to set up remote  interrogations once that is set up  -Follow-up with EP in 6 months  -Continue beta-blocker    Essential hypertension  -Continue current medications.     - Return in about 1 year (around 12/6/2022) for Next scheduled follow-up with an ECG.      Hari Vargas MD  12/06/21 15:17 EST

## 2022-04-01 RX ORDER — POTASSIUM CHLORIDE 20 MEQ/1
TABLET, EXTENDED RELEASE ORAL
Qty: 30 TABLET | Refills: 3 | Status: SHIPPED | OUTPATIENT
Start: 2022-04-01 | End: 2022-07-26

## 2022-06-06 ENCOUNTER — OFFICE VISIT (OUTPATIENT)
Dept: CARDIOLOGY | Facility: CLINIC | Age: 67
End: 2022-06-06

## 2022-06-06 VITALS
DIASTOLIC BLOOD PRESSURE: 76 MMHG | OXYGEN SATURATION: 96 % | WEIGHT: 214.6 LBS | HEART RATE: 74 BPM | BODY MASS INDEX: 32.52 KG/M2 | HEIGHT: 68 IN | SYSTOLIC BLOOD PRESSURE: 118 MMHG

## 2022-06-06 DIAGNOSIS — I10 ESSENTIAL HYPERTENSION: ICD-10-CM

## 2022-06-06 DIAGNOSIS — Z95.0 PRESENCE OF PERMANENT CARDIAC PACEMAKER: ICD-10-CM

## 2022-06-06 DIAGNOSIS — I49.5 CHRONOTROPIC INCOMPETENCE WITH SINUS NODE DYSFUNCTION: Primary | ICD-10-CM

## 2022-06-06 DIAGNOSIS — R06.09 DYSPNEA ON EXERTION: ICD-10-CM

## 2022-06-06 DIAGNOSIS — I50.32 CHRONIC DIASTOLIC HEART FAILURE: ICD-10-CM

## 2022-06-06 PROCEDURE — 99214 OFFICE O/P EST MOD 30 MIN: CPT | Performed by: INTERNAL MEDICINE

## 2022-06-06 PROCEDURE — 93280 PM DEVICE PROGR EVAL DUAL: CPT | Performed by: INTERNAL MEDICINE

## 2022-06-06 NOTE — PROGRESS NOTES
Cardiac Electrophysiology Outpatient Follow Up Note            [facility]    Follow Up Office Visit      Gen Torres  [id number]  06/06/2022  [unfilled]  [unfilled]    Primary Care Physician: Wendy Allen APRN    Referred By: No ref. provider found    Subjective     Chief Complaint:   Diagnoses and all orders for this visit:    1. Chronotropic incompetence with sinus node dysfunction (HCC) (Primary)    2. Presence of permanent cardiac pacemaker    3. Dyspnea on exertion    4. Essential hypertension    5. Chronic diastolic heart failure (HCC)      Chief Complaint   Patient presents with   • Coronary artery disease of native artery of native heart wi   • Sinus node dysfunction       History of Present Illness:   Gen Torres is a 67 y.o. male who presents to my electrophysiology clinic for follow up of above complaints.  Feels a lot better.  He is lost 45 pounds through dietary discretion.    No chest pain nausea vomit fevers chills syncope presyncope no palpitations no pain at the pacemaker site.      Review of Systems:   Constitutional: No fevers or chills, no recent weight gain or weight loss or fatigue  Eyes: No visual loss, blurred vision, double vision, yellow sclerae.  ENT: No headaches, hearing loss, vertigo, congestion or sore throat.   Cardiovascular: Per HPI  Respiratory: No cough or wheezing, no sputum production, no hematemesis   Gastrointestinal: No abdominal pain, no nausea, vomiting, constipation, diarrhea, melena.     Past Medical History:   Past Medical History:   Diagnosis Date   • Absent left knee reflex     His wife showed a concern about absent knee reflexes and according to the patient this could have been attributed to his severe back related problem.    • CAD (coronary artery disease)     CABG   • CHF (congestive heart failure) (HCC)    • Diabetes mellitus (HCC)     checsk sugar once per week    • ED (erectile dysfunction)      Further history revealed that he also has erectile dysfunction which he attributed may be due to s/p prostatic cancer.   • GERD (gastroesophageal reflux disease)    • Headache    • Hyperlipidemia    • Hypertension    • Lower extremity edema      This seems to be fairly resolved at this point.    • Myocardial infarction (HCC)     x2 1994 then 1199-3251 and had bypass after    • Neuropathy    • Obesity    • KAYLEIGH on CPAP     cpap- compliant with machine    • Prostate cancer (HCC)    • SOB (shortness of breath)       During his first visit, the patient mentioned having very occasional shortness of air which he correlates with gaining weight.  This seems to be fairly resolved at this point.    • Wears glasses     readers       Past Surgical History:   Past Surgical History:   Procedure Laterality Date   • CARDIAC CATHETERIZATION     • CARDIAC CATHETERIZATION N/A 05/09/2018    Procedure: Left Heart Cath;  Surgeon: Hari Vargas MD;  Location:  [facility];  Service: Cardiovascular   • CARDIAC CATHETERIZATION N/A 05/09/2018    Procedure: Coronary angiography;  Surgeon: Hari Vargas MD;  Location:  [facility];  Service: Cardiovascular   • CARDIAC CATHETERIZATION N/A 10/22/2019    Procedure: Left Heart Cath;  Surgeon: Hari Vargas MD;  Location:  [facility];  Service: Cardiology   • CARDIAC ELECTROPHYSIOLOGY PROCEDURE N/A 09/01/2020    Procedure: [place] [place]  Surgeon: Sandeep Marina DO;  Location:  [facility] EP INVASIVE LOCATION;  Service: Cardiology;  Laterality: N/A;   • CARDIAC SURGERY     • COLONOSCOPY     • CORONARY ARTERY BYPASS GRAFT      x5 vessles    • CORONARY STENT PLACEMENT  01/01/17   • ENDOSCOPY     • INSERT / REPLACE / REMOVE PACEMAKER  2019   • PROSTATECTOMY      Status post radical prostatectomy for carcinoma.   • WISDOM TOOTH EXTRACTION      1 remain pt thinks        Family History:   Family History   Problem Relation Age of Onset   • Heart  attack Father    • Heart disease Father    • Cancer Father    • Cancer Mother    • Heart disease Sister    • Diabetes Sister    • Heart disease Brother        Social History:   Social History     Socioeconomic History   • Marital status:    Tobacco Use   • Smoking status: Former Smoker     Packs/day: 4.00     Years: 26.00     Pack years: 104.00     Types: Cigarettes, Cigarettes     Start date: 1968     Quit date: 1994     Years since quittin.4   • Smokeless tobacco: Never Used   Vaping Use   • Vaping Use: Never used   Substance and Sexual Activity   • Alcohol use: No   • Drug use: No   • Sexual activity: Not Currently     Partners: Female       Medications:     Current Outpatient Medications:   •  aspirin 81 MG tablet, Take 1 tablet by mouth Daily. (Patient taking differently: Take 81 mg by mouth Every Night.), Disp: 90 tablet, Rfl: 3  •  atorvastatin (LIPITOR) 80 MG tablet, Take 1 tablet by mouth Every Night., Disp: 90 tablet, Rfl: 3  •  carvedilol (COREG) 12.5 MG tablet, Take 1 tablet by mouth 2 (Two) Times a Day With Meals., Disp: 180 tablet, Rfl: 3  •  DULoxetine (CYMBALTA) 60 MG capsule, Take 60 mg by mouth Daily., Disp: , Rfl: 0  •  fexofenadine (ALLEGRA) 180 MG tablet, Take 180 mg by mouth Daily., Disp: , Rfl:   •  furosemide (LASIX) 20 MG tablet, Take 1 tablet by mouth Daily As Needed (Swelling)., Disp: 60 tablet, Rfl: 3  •  losartan (COZAAR) 100 MG tablet, Take 1 tablet by mouth Every Night., Disp: 90 tablet, Rfl: 3  •  metFORMIN (GLUCOPHAGE) 500 MG tablet, Take 1 tablet by mouth 2 (Two) Times a Day., Disp: , Rfl: 0  •  omeprazole (priLOSEC) 20 MG capsule, Take 20 mg by mouth Daily., Disp: , Rfl:   •  potassium chloride (K-DUR,KLOR-CON) 20 MEQ CR tablet, TAKE 1 TABLET BY MOUTH ONCE DAILY WHEN TAKING FUROSEMIDE, Disp: 30 tablet, Rfl: 3    Allergies:   Allergies   Allergen Reactions   • Contrast Dye Shortness Of Breath     Hives, itching   • Iodine Itching and Rash   • Spironolactone  "Diarrhea     He was not able to tolerate the spironolactone secondary to diarrhea.       Objective   Vital Signs:   Vitals:    06/06/22 1407   BP: 118/76   BP Location: Left arm   Patient Position: Sitting   Cuff Size: Adult   Pulse: 74   SpO2: 96%   Weight: 97.3 kg (214 lb 9.6 oz)   Height: 172.7 cm (68\")       PHYSICAL EXAM  General appearance: Awake, alert, cooperative  Head: Normocephalic, without obvious abnormality, atraumatic  Eyes: Conjunctivae/corneas clear, EOMs intact  Neck: no adenopathy, no carotid bruit, no JVD and thyroid: not enlarged  Lungs: clear to auscultation bilaterally and no rhonchi or crackles\", ' symmetric  Heart: regular rate and rhythm, S1, S2 normal, no murmur, click, rub or gallop  Abdomen: Soft, non-tender, bowel sounds normal,  no organomegaly  Extremities: extremities normal, atraumatic, no cyanosis or edema  Skin: Skin color, turgor normal, no rashes or lesions  Neurologic: Grossly normal     Lab Results   Component Value Date    GLUCOSE 141 (H) 09/01/2020    CALCIUM 9.0 09/01/2020     09/01/2020    K 4.2 09/01/2020    CO2 25.0 09/01/2020     09/01/2020    BUN 14 09/01/2020    CREATININE 0.97 09/01/2020    EGFRIFNONA 78 09/01/2020    BCR 14.4 09/01/2020    ANIONGAP 10.0 09/01/2020     Lab Results   Component Value Date    WBC 6.25 08/30/2020    HGB 14.0 08/30/2020    HCT 40.8 08/30/2020    MCV 94.7 08/30/2020     (L) 08/30/2020     Lab Results   Component Value Date    INR 1.01 05/08/2018    INR 1.04 01/02/2015    PROTIME 10.6 05/08/2018    PROTIME 11.1 01/02/2015     No results found for: TSH, F0NJNGC, A7TYDQB, THYROIDAB    Cardiac Testing:     I personally viewed and interpreted the patient's EKG/Telemetry/lab data    Procedures    Tobacco Cessation: N/A  Obstructive Sleep Apnea Screening: Completed    Assessment & Plan    Diagnoses and all orders for this visit:    1. Chronotropic incompetence with sinus node dysfunction (HCC) (Primary)    2. Presence of " permanent cardiac pacemaker    3. Dyspnea on exertion    4. Essential hypertension    5. Chronic diastolic heart failure (HCC)         Diagnosis Plan   1. Chronotropic incompetence with sinus node dysfunction (HCC)   feels great.  Conservative competence restored with dual-chamber [employer] permanent pacemaker.   2. Presence of permanent cardiac pacemaker    This patient's Cardiac Implanted Electronic Device was manually interrogated and reprogrammed during the patient encounter today.  Iterative programming changes were manually made to determine the sensing threshold, pacing threshold, lead impedance as well as underlying cardiac rhythm.  These programming changes were not limited to but included some or all of the following when appropriate: pacing mode, programmed AV delays, blanking periods, and refractory periods.  Data obtained as a result of these manual programing changes informed the patient's CIED permanent programming.    Not on home telemetry as there is no cell phone reception and no Internet service where they live.  They will be receiving fiberoptic Internet however in the next couple of weeks.  At that point we will need to establish with home monitoring.   3. Dyspnea on exertion   much improved.   4. Essential hypertension   blood pressure well controlled.  Losing weight.  This also helps.   5. Chronic diastolic heart failure (HCC)   weight loss is led to tremendous improvement.  No further diuresis required.     Body mass index is 32.63 kg/m².    I spent 38 minutes in consultation with this patient which included more than 65% of this time in direct face-to-face counseling, physical examination and discussion of my assessment and findings and shared decision making with the patient.  The remainder of the time not spent face to face was performing one, some or all of the following actions:  preparing to see this patient ( eg. Review of tests),  ordering medications, tests or procedures ),  care coordination, discussion of the plan with other healthcare providers, documenting clinical information in Epic well as independently interpreting results and communicating results to patient, family and or caregiver.  All time noted occurred on the date of service.    Follow Up:       Thank you for allowing me to participate in the care of your patient. Please to not hesitate to contact me with additional questions or concerns.      Sandeep Marina, DO, FACC, RS  Cardiac Electrophysiologist  [facility] / [facility]

## 2022-07-26 RX ORDER — FUROSEMIDE 20 MG/1
TABLET ORAL
Qty: 60 TABLET | Refills: 3 | Status: SHIPPED | OUTPATIENT
Start: 2022-07-26

## 2022-07-26 RX ORDER — POTASSIUM CHLORIDE 20 MEQ/1
TABLET, EXTENDED RELEASE ORAL
Qty: 30 TABLET | Refills: 3 | Status: SHIPPED | OUTPATIENT
Start: 2022-07-26

## 2022-12-12 ENCOUNTER — OFFICE VISIT (OUTPATIENT)
Dept: CARDIOLOGY | Facility: CLINIC | Age: 67
End: 2022-12-12

## 2022-12-12 VITALS
DIASTOLIC BLOOD PRESSURE: 68 MMHG | BODY MASS INDEX: 33.49 KG/M2 | HEIGHT: 68 IN | OXYGEN SATURATION: 97 % | SYSTOLIC BLOOD PRESSURE: 118 MMHG | RESPIRATION RATE: 18 BRPM | HEART RATE: 82 BPM | WEIGHT: 221 LBS

## 2022-12-12 DIAGNOSIS — I25.118 CORONARY ARTERY DISEASE OF NATIVE ARTERY OF NATIVE HEART WITH STABLE ANGINA PECTORIS: Primary | ICD-10-CM

## 2022-12-12 DIAGNOSIS — E78.2 MIXED HYPERLIPIDEMIA: ICD-10-CM

## 2022-12-12 PROCEDURE — 93000 ELECTROCARDIOGRAM COMPLETE: CPT | Performed by: INTERNAL MEDICINE

## 2022-12-12 PROCEDURE — 99214 OFFICE O/P EST MOD 30 MIN: CPT | Performed by: INTERNAL MEDICINE

## 2022-12-12 RX ORDER — DULOXETIN HYDROCHLORIDE 30 MG/1
30 CAPSULE, DELAYED RELEASE ORAL DAILY
COMMUNITY
Start: 2022-11-07

## 2022-12-12 RX ORDER — SEMAGLUTIDE 1.34 MG/ML
INJECTION, SOLUTION SUBCUTANEOUS
COMMUNITY
Start: 2022-11-28

## 2022-12-12 RX ORDER — MELOXICAM 15 MG/1
15 TABLET ORAL DAILY
COMMUNITY
Start: 2022-10-19

## 2022-12-12 NOTE — PROGRESS NOTES
[facility] AT [address], [address]  [place], [place], [address] [phone]  WWW.[facility]           OUTPATIENT CLINIC FOLLOW UP NOTE    Patient Care Team:  Patient Care Team:  Wendy Allen APRN as PCP - General    Subjective:   Reason for consultation:   Chief Complaint   Patient presents with   • Coronary Artery Disease     1yr fu, CAD       HPI:    Gen Torres is a 67 y.o. male.  Cardiac problem list:  1. Multivessel CAD   1. Remote stenting  2. Status post 5 vessel bypass (LIMA to LAD and diag, SVG to PDA, SVG to OM )  3. ANNA SVG to OM, 2018  4. No new obstructive disease on [facility] 2019  2. Chronic diastolic heart failure  3. Sick sinus syndrome status post DC PPM, BS, Dr. Marina, 8/2020  4. PVCs  1. 9% burden on heart monitor 2/2020  5. Persistent shortness of breath of unknown etiology  1. Patent bypass grafts on heart cath 10/2019  2. Unremarkable chest x-ray, PFTs 11/2019  6. Relative bradycardia  7. Essential hypertension  8. Mixed hyperlipidemia  9. Diabetes.      He presents today for follow-up.    Denies chest pain, palpitations.  Tolerating his medicines without difficulty.  Has mild stable chronic dyspnea that has not changed or worsened.    Lost 30 pounds intentionally    Review of Systems:  As noted in the HPI    PFSH:  Patient Active Problem List   Diagnosis   • Obesity   • Diabetes mellitus (HCC)   • Mixed hyperlipidemia   • Essential hypertension   • KAYLEIGH on CPAP   • ED (erectile dysfunction)   • Chronic diastolic heart failure (HCC)   • Coronary artery disease of native artery of native heart with stable angina pectoris (HCC)   • Dyspnea on exertion   • Fatigue   • PVC (premature ventricular contraction)   • Chronotropic incompetence with sinus node dysfunction (HCC)   • Presence of permanent cardiac pacemaker         Current Outpatient Medications:   •  aspirin 81 MG tablet, Take 1 tablet by mouth Daily. (Patient taking differently: Take  "81 mg by mouth Every Night.), Disp: 90 tablet, Rfl: 3  •  atorvastatin (LIPITOR) 80 MG tablet, Take 1 tablet by mouth Every Night., Disp: 90 tablet, Rfl: 3  •  carvedilol (COREG) 12.5 MG tablet, Take 1 tablet by mouth 2 (Two) Times a Day With Meals., Disp: 180 tablet, Rfl: 3  •  DULoxetine (CYMBALTA) 30 MG capsule, Take 30 mg by mouth Daily., Disp: , Rfl:   •  fexofenadine (ALLEGRA) 180 MG tablet, Take 180 mg by mouth Daily., Disp: , Rfl:   •  furosemide (LASIX) 20 MG tablet, TAKE 1 TABLET BY MOUTH ONCE DAILY AS NEEDED FOR SWELLING, Disp: 60 tablet, Rfl: 3  •  losartan (COZAAR) 100 MG tablet, Take 1 tablet by mouth Every Night., Disp: 90 tablet, Rfl: 3  •  meloxicam (MOBIC) 15 MG tablet, Take 15 mg by mouth Daily., Disp: , Rfl:   •  metFORMIN (GLUCOPHAGE) 500 MG tablet, Take 1 tablet by mouth 2 (Two) Times a Day., Disp: , Rfl: 0  •  omeprazole (priLOSEC) 20 MG capsule, Take 20 mg by mouth Daily., Disp: , Rfl:   •  Ozempic, 0.25 or 0.5 MG/DOSE, 2 MG/1.5ML solution pen-injector, INJECT 0.25 MG ONCE WEEKLY AND THEN INCREASE TO 0.5 MG ONCE WEEKLY, Disp: , Rfl:   •  potassium chloride (K-DUR,KLOR-CON) 20 MEQ CR tablet, TAKE 1 TABLET BY MOUTH ONCE DAILY WHEN TAKING FUROSEMIDE, Disp: 30 tablet, Rfl: 3    Allergies   Allergen Reactions   • Contrast Dye Shortness Of Breath     Hives, itching   • Iodine Itching and Rash   • Spironolactone Diarrhea     He was not able to tolerate the spironolactone secondary to diarrhea.        reports that he quit smoking about 28 years ago. His smoking use included cigarettes and cigarettes. He started smoking about 54 years ago. He has a 104.00 pack-year smoking history. He has never used smokeless tobacco.      Objective:   Blood pressure 118/68, pulse 82, resp. rate 18, height 172.7 cm (68\"), weight 100 kg (221 lb), SpO2 97 %.  CONSTITUTIONAL: No acute distress, normal affect  CARDIOVASCULAR: Regular rate and rhythm with normal S1 and S2. Without gallop or rub.  Systolic ejection murmur " at the right upper sternal border, no radiation  PERIPHERAL VASCULAR: Normal radial pulses bilaterally.  No carotid bruit bilaterally     Labs:    Lab Results   Component Value Date    CHOL 171 10/22/2019    TRIG 115 10/22/2019    HDL 31 (L) 10/22/2019    CREATININE 0.97 09/01/2020       Diagnostic Data:      ECG 12 Lead    Date/Time: 12/12/2022 1:40 PM  Performed by: Hari Vargas MD  Authorized by: Hari Vargas MD   Comparison: compared with previous ECG from 12/6/2021  Similar to previous ECG  Comparison to previous ECG: Inferior lateral T wave abnormality, unchanged  Rhythm: sinus rhythm            [employer] Leuch[employer] heart monitor 2/2020: PVC burden 9%, symptomatic PVCs on occasion    [facility] 5/2018  · There was severe multivessel coronary artery disease involving a 80% diffuse proximal LAD stenosis, 100%  of the left circumflex artery, 100%  of the right coronary artery.  There is a patent sequential LIMA to diagonal branch and LAD, patent sequential SVG to a diagonal branch and to a large OM, and a patent SVG to the RPDA.  · There was an 80% discrete stenosis just distal to the SVG to OM anastomosis that was found to be functionally significant with an iFR ratio 0.87.  This lesion is now status post intervention with a Xience Alpine 2.75 x 12 mm drug-eluting stent.    Stress test 10/2019  · Left ventricular ejection fraction is normal (Calculated EF = 51%).  · There were occasional PVCs throughout the study.  · Myocardial perfusion imaging indicates a large-sized infarct located in the lateral wall and inferior wall with mild ivonne-infarct ischemia.  · Impressions are consistent with an intermediate risk study.    [facility] 10/2019  · There is severe multivessel coronary artery disease with widely patent 5 vessel bypass as well as a previously placed stent  · There is no new significant change in the coronary anatomy when compared to the postintervention angiographic images from May 9, 2018  · Low normal left ventricular  ejection fraction 50%      Results for orders placed during the hospital encounter of 09/30/19    Adult Transthoracic Echo Complete W/ Cont if Necessary Per Protocol    Interpretation Summary  · Left ventricular systolic function is normal. Estimated EF appears to be in the range of 51 - 55%.  · Left ventricular wall thickness is consistent with mild-to-moderate concentric hypertrophy.  · The following left ventricular wall segments are hypokinetic: mid inferolateral, apical inferior and mid inferior.  · Left ventricular diastolic dysfunction (grade I a) consistent with impaired relaxation.  · Left atrial cavity size is moderately dilated.  · Mild mitral valve regurgitation is present    Assessment and Plan:     Coronary artery disease involving native coronary artery of native heart without angina pectoris  Mixed hyperlipidemia  -Continue aspirin, carvedilol, atorvastatin  -Requesting last CMP and lipid panel from PCP office    Chronic diastolic heart failure  Lower extremity swelling  -EF 55% 5/2018  -Continue beta-blocker and losartan  -Continue Lasix as needed, to be taken with concomitant potassium     PVCs, 9% burden  Sick sinus syndrome status post pacemaker  -Follow up with EP  -Continue beta-blocker    Essential hypertension  -Continue current medications.     - Return in about 1 year (around 12/12/2023) for Next scheduled follow-up with an ECG.      Hari Vargas MD  12/12/22 13:41 EST

## 2023-03-08 ENCOUNTER — HOSPITAL ENCOUNTER (EMERGENCY)
Facility: HOSPITAL | Age: 68
Discharge: HOME | End: 2023-03-08
Attending: EMERGENCY MEDICINE | Admitting: EMERGENCY MEDICINE
Payer: MEDICARE

## 2023-03-08 ENCOUNTER — APPOINTMENT (OUTPATIENT)
Dept: CARDIOLOGY | Facility: HOSPITAL | Age: 68
End: 2023-03-08
Payer: MEDICARE

## 2023-03-08 ENCOUNTER — APPOINTMENT (OUTPATIENT)
Dept: GENERAL RADIOLOGY | Facility: HOSPITAL | Age: 68
End: 2023-03-08
Payer: MEDICARE

## 2023-03-08 VITALS
TEMPERATURE: 97.7 F | HEIGHT: 68 IN | HEART RATE: 80 BPM | RESPIRATION RATE: 20 BRPM | WEIGHT: 215 LBS | OXYGEN SATURATION: 100 % | SYSTOLIC BLOOD PRESSURE: 172 MMHG | DIASTOLIC BLOOD PRESSURE: 93 MMHG | BODY MASS INDEX: 32.58 KG/M2

## 2023-03-08 DIAGNOSIS — R60.0 EDEMA OF LEFT LOWER LEG: ICD-10-CM

## 2023-03-08 DIAGNOSIS — M79.602 LEFT ARM PAIN: ICD-10-CM

## 2023-03-08 DIAGNOSIS — S89.92XA INJURY OF LEFT LOWER LEG, INITIAL ENCOUNTER: Primary | ICD-10-CM

## 2023-03-08 LAB
ALBUMIN SERPL-MCNC: 4.5 G/DL (ref 3.5–5.2)
ALBUMIN/GLOB SERPL: 2.1 G/DL
ALP SERPL-CCNC: 57 U/L (ref 39–117)
ALT SERPL W P-5'-P-CCNC: 17 U/L (ref 1–41)
ANION GAP SERPL CALCULATED.3IONS-SCNC: 10 MMOL/L (ref 5–15)
AST SERPL-CCNC: 16 U/L (ref 1–40)
BASOPHILS # BLD AUTO: 0.04 10*3/MM3 (ref 0–0.2)
BASOPHILS NFR BLD AUTO: 0.7 % (ref 0–1.5)
BH CV LOWER VASCULAR LEFT COMMON FEMORAL AUGMENT: NORMAL
BH CV LOWER VASCULAR LEFT COMMON FEMORAL COMPRESS: NORMAL
BH CV LOWER VASCULAR LEFT COMMON FEMORAL PHASIC: NORMAL
BH CV LOWER VASCULAR LEFT COMMON FEMORAL SPONT: NORMAL
BH CV LOWER VASCULAR LEFT DISTAL FEMORAL AUGMENT: NORMAL
BH CV LOWER VASCULAR LEFT DISTAL FEMORAL COMPRESS: NORMAL
BH CV LOWER VASCULAR LEFT DISTAL FEMORAL PHASIC: NORMAL
BH CV LOWER VASCULAR LEFT DISTAL FEMORAL SPONT: NORMAL
BH CV LOWER VASCULAR LEFT GASTRONEMIUS COMPRESS: NORMAL
BH CV LOWER VASCULAR LEFT GREATER SAPH AK COMPRESS: NORMAL
BH CV LOWER VASCULAR LEFT GREATER SAPH BK COMPRESS: NORMAL
BH CV LOWER VASCULAR LEFT LESSER SAPH COMPRESS: NORMAL
BH CV LOWER VASCULAR LEFT MID FEMORAL AUGMENT: NORMAL
BH CV LOWER VASCULAR LEFT MID FEMORAL COMPRESS: NORMAL
BH CV LOWER VASCULAR LEFT MID FEMORAL PHASIC: NORMAL
BH CV LOWER VASCULAR LEFT MID FEMORAL SPONT: NORMAL
BH CV LOWER VASCULAR LEFT PERONEAL COMPRESS: NORMAL
BH CV LOWER VASCULAR LEFT POPLITEAL AUGMENT: NORMAL
BH CV LOWER VASCULAR LEFT POPLITEAL COMPRESS: NORMAL
BH CV LOWER VASCULAR LEFT POPLITEAL PHASIC: NORMAL
BH CV LOWER VASCULAR LEFT POPLITEAL SPONT: NORMAL
BH CV LOWER VASCULAR LEFT POSTERIOR TIBIAL COMPRESS: NORMAL
BH CV LOWER VASCULAR LEFT PROFUNDA FEMORAL AUGMENT: NORMAL
BH CV LOWER VASCULAR LEFT PROFUNDA FEMORAL COMPRESS: NORMAL
BH CV LOWER VASCULAR LEFT PROFUNDA FEMORAL PHASIC: NORMAL
BH CV LOWER VASCULAR LEFT PROFUNDA FEMORAL SPONT: NORMAL
BH CV LOWER VASCULAR LEFT PROXIMAL FEMORAL AUGMENT: NORMAL
BH CV LOWER VASCULAR LEFT PROXIMAL FEMORAL COMPRESS: NORMAL
BH CV LOWER VASCULAR LEFT PROXIMAL FEMORAL PHASIC: NORMAL
BH CV LOWER VASCULAR LEFT PROXIMAL FEMORAL SPONT: NORMAL
BH CV LOWER VASCULAR LEFT SAPHENOFEMORAL JUNCTION AUGMENT: NORMAL
BH CV LOWER VASCULAR LEFT SAPHENOFEMORAL JUNCTION COMPRESS: NORMAL
BH CV LOWER VASCULAR LEFT SAPHENOFEMORAL JUNCTION PHASIC: NORMAL
BH CV LOWER VASCULAR LEFT SAPHENOFEMORAL JUNCTION SPONT: NORMAL
BH CV LOWER VASCULAR RIGHT COMMON FEMORAL AUGMENT: NORMAL
BH CV LOWER VASCULAR RIGHT COMMON FEMORAL COMPRESS: NORMAL
BH CV LOWER VASCULAR RIGHT COMMON FEMORAL PHASIC: NORMAL
BH CV LOWER VASCULAR RIGHT COMMON FEMORAL SPONT: NORMAL
BH CV VAS PRELIMINARY FINDINGS SCRIPTING: 1
BILIRUB SERPL-MCNC: 1 MG/DL (ref 0–1.2)
BUN SERPL-MCNC: 12 MG/DL (ref 8–23)
BUN/CREAT SERPL: 16.7 (ref 7–25)
CALCIUM SPEC-SCNC: 9.1 MG/DL (ref 8.6–10.5)
CHLORIDE SERPL-SCNC: 104 MMOL/L (ref 98–107)
CO2 SERPL-SCNC: 28 MMOL/L (ref 22–29)
CREAT SERPL-MCNC: 0.72 MG/DL (ref 0.76–1.27)
DEPRECATED RDW RBC AUTO: 47.1 FL (ref 37–54)
EGFRCR SERPLBLD CKD-EPI 2021: 100.8 ML/MIN/1.73
EOSINOPHIL # BLD AUTO: 0.19 10*3/MM3 (ref 0–0.4)
EOSINOPHIL NFR BLD AUTO: 3.3 % (ref 0.3–6.2)
ERYTHROCYTE [DISTWIDTH] IN BLOOD BY AUTOMATED COUNT: 13.2 % (ref 12.3–15.4)
GEN 5 2HR TROPONIN T REFLEX: 10 NG/L
GLOBULIN UR ELPH-MCNC: 2.1 GM/DL
GLUCOSE SERPL-MCNC: 98 MG/DL (ref 65–99)
HCT VFR BLD AUTO: 43 % (ref 37.5–51)
HGB BLD-MCNC: 15.2 G/DL (ref 13–17.7)
HOLD SPECIMEN: NORMAL
IMM GRANULOCYTES # BLD AUTO: 0.02 10*3/MM3 (ref 0–0.05)
IMM GRANULOCYTES NFR BLD AUTO: 0.4 % (ref 0–0.5)
LYMPHOCYTES # BLD AUTO: 1.49 10*3/MM3 (ref 0.7–3.1)
LYMPHOCYTES NFR BLD AUTO: 26.1 % (ref 19.6–45.3)
MAXIMAL PREDICTED HEART RATE: 154 BPM
MCH RBC QN AUTO: 34.1 PG (ref 26.6–33)
MCHC RBC AUTO-ENTMCNC: 35.3 G/DL (ref 31.5–35.7)
MCV RBC AUTO: 96.4 FL (ref 79–97)
MONOCYTES # BLD AUTO: 0.4 10*3/MM3 (ref 0.1–0.9)
MONOCYTES NFR BLD AUTO: 7 % (ref 5–12)
NEUTROPHILS NFR BLD AUTO: 3.57 10*3/MM3 (ref 1.7–7)
NEUTROPHILS NFR BLD AUTO: 62.5 % (ref 42.7–76)
NRBC BLD AUTO-RTO: 0 /100 WBC (ref 0–0.2)
PLATELET # BLD AUTO: 139 10*3/MM3 (ref 140–450)
PMV BLD AUTO: 8.9 FL (ref 6–12)
POTASSIUM SERPL-SCNC: 4.3 MMOL/L (ref 3.5–5.2)
PROT SERPL-MCNC: 6.6 G/DL (ref 6–8.5)
RBC # BLD AUTO: 4.46 10*6/MM3 (ref 4.14–5.8)
SODIUM SERPL-SCNC: 142 MMOL/L (ref 136–145)
STRESS TARGET HR: 131 BPM
TROPONIN T DELTA: 1 NG/L
TROPONIN T SERPL HS-MCNC: 9 NG/L
WBC NRBC COR # BLD: 5.71 10*3/MM3 (ref 3.4–10.8)
WHOLE BLOOD HOLD COAG: NORMAL
WHOLE BLOOD HOLD SPECIMEN: NORMAL

## 2023-03-08 PROCEDURE — 93971 EXTREMITY STUDY: CPT

## 2023-03-08 PROCEDURE — 84484 ASSAY OF TROPONIN QUANT: CPT | Performed by: EMERGENCY MEDICINE

## 2023-03-08 PROCEDURE — 73590 X-RAY EXAM OF LOWER LEG: CPT

## 2023-03-08 PROCEDURE — 93971 EXTREMITY STUDY: CPT | Performed by: INTERNAL MEDICINE

## 2023-03-08 PROCEDURE — 71045 X-RAY EXAM CHEST 1 VIEW: CPT

## 2023-03-08 PROCEDURE — 99284 EMERGENCY DEPT VISIT MOD MDM: CPT

## 2023-03-08 PROCEDURE — 36415 COLL VENOUS BLD VENIPUNCTURE: CPT

## 2023-03-08 PROCEDURE — 85025 COMPLETE CBC W/AUTO DIFF WBC: CPT | Performed by: EMERGENCY MEDICINE

## 2023-03-08 PROCEDURE — 80053 COMPREHEN METABOLIC PANEL: CPT | Performed by: EMERGENCY MEDICINE

## 2023-03-08 PROCEDURE — 93005 ELECTROCARDIOGRAM TRACING: CPT | Performed by: EMERGENCY MEDICINE

## 2023-03-08 RX ORDER — SODIUM CHLORIDE 0.9 % (FLUSH) 0.9 %
10 SYRINGE (ML) INJECTION AS NEEDED
Status: DISCONTINUED | OUTPATIENT
Start: 2023-03-08 | End: 2023-03-08 | Stop reason: HOSPADM

## 2023-03-08 NOTE — ED PROVIDER NOTES
Subjective   History of Present Illness  Patient is a pleasant 67-year-old male with a long medical history including coronary artery disease, CHF, diabetes, hyperlipidemia, hypertension who presents today with multiple complaints including left lower extremity and left upper extremity pain.  He says that the newest complaint is the left lower extremity pain.  He states that approximately 1-1/2 weeks ago he was simply standing and talking when he felt a pop in his left calf.  He said that he came abruptly and he looked back as if something and hit his leg.  He was not jumping or lifting or doing any strenuous activity at the time.  After this the calf is continued to hurt him although the pain is improving.  He did have some swelling at the site and more recently has developed some blue contusion.  Area around his left ankle.  Given this a swelling and persistent symptoms he presents to the emergency department today.    In addition to his primary complaint he also notes that he has had some left forearm and hand discomfort.  This been present for the past 3 to 4 months he had no swelling or decreased function in the area.  He does not know the reason for this pain as he had no injury and has had no similar episodes in the past.  Again, no sensory or motor deficits.  No swelling, no discoloration.  Patient does have a history of diabetes, which at this time is well controlled, but does have a chronic neuropathy secondary to this.    Denies fever, chills, chest pain, shortness of breath, abdominal pain vomiting, or diarrhea.  They were talking to someone, possibly healthcare professional, and given the persistent left arm pain they were concerned about possible cardiac etiology.        Review of Systems   All other systems reviewed and are negative.      Past Medical History:   Diagnosis Date   • Absent left knee reflex     His wife showed a concern about absent knee reflexes and according to the patient this could  have been attributed to his severe back related problem.    • CAD (coronary artery disease)     CABG   • CHF (congestive heart failure) ([facility])    • Diabetes mellitus ([facility])     checsk sugar once per week    • ED (erectile dysfunction)     Further history revealed that he also has erectile dysfunction which he attributed may be due to s/p prostatic cancer.   • GERD (gastroesophageal reflux disease)    • Headache    • Hyperlipidemia    • Hypertension    • Lower extremity edema      This seems to be fairly resolved at this point.    • Myocardial infarction ([facility])     x2 1994 then [id number] and had bypass after    • Neuropathy    • Obesity    • KAYLEIGH on CPAP     cpap- compliant with machine    • Prostate cancer ([facility])    • SOB (shortness of breath)       During his first visit, the patient mentioned having very occasional shortness of air which he correlates with gaining weight.  This seems to be fairly resolved at this point.    • Wears glasses     readers       Allergies   Allergen Reactions   • Contrast Dye (Echo Or Unknown Ct/Mr) Shortness Of Breath     Hives, itching   • Iodine Itching and Rash   • Spironolactone Diarrhea     He was not able to tolerate the spironolactone secondary to diarrhea.       Past Surgical History:   Procedure Laterality Date   • CARDIAC CATHETERIZATION     • CARDIAC CATHETERIZATION N/A 05/09/2018    Procedure: Left Heart Cath;  Surgeon: Hari Vargas MD;  Location:  [facility];  Service: Cardiovascular   • CARDIAC CATHETERIZATION N/A 05/09/2018    Procedure: Coronary angiography;  Surgeon: Hari Vargas MD;  Location:  [facility];  Service: Cardiovascular   • CARDIAC CATHETERIZATION N/A 10/22/2019    Procedure: Left Heart Cath;  Surgeon: Hari Vargas MD;  Location:  [facility];  Service: Cardiology   • CARDIAC ELECTROPHYSIOLOGY PROCEDURE N/A 09/01/2020    Procedure: DDD PPM Implant ([facility]);  Surgeon: Sandeep Marina DO;  Location:  [facility] EP  INVASIVE LOCATION;  Service: Cardiology;  Laterality: N/A;   • CARDIAC SURGERY     • COLONOSCOPY     • CORONARY ARTERY BYPASS GRAFT      x5 vessles    • CORONARY STENT PLACEMENT  17   • ENDOSCOPY     • INSERT / REPLACE / REMOVE PACEMAKER  2019   • PROSTATECTOMY      Status post radical prostatectomy for carcinoma.   • WISDOM TOOTH EXTRACTION      1 remain pt thinks        Family History   Problem Relation Age of Onset   • Heart attack Father    • Heart disease Father    • Cancer Father    • Cancer Mother    • Heart disease Sister    • Diabetes Sister    • Heart disease Brother        Social History     Socioeconomic History   • Marital status:    Tobacco Use   • Smoking status: Former     Packs/day: 4.00     Years: 26.00     Pack years: 104.00     Types: Cigarettes     Start date: 1968     Quit date: 1994     Years since quittin.2   • Smokeless tobacco: Never   Vaping Use   • Vaping Use: Never used   Substance and Sexual Activity   • Alcohol use: No   • Drug use: No   • Sexual activity: Not Currently     Partners: Female           Objective   Physical Exam  Vitals and nursing note reviewed.   Constitutional:       Appearance: Normal appearance.   HENT:      Head: Normocephalic and atraumatic.      Mouth/Throat:      Mouth: Mucous membranes are moist.   Eyes:      Extraocular Movements: Extraocular movements intact.      Pupils: Pupils are equal, round, and reactive to light.   Cardiovascular:      Rate and Rhythm: Normal rate and regular rhythm.      Pulses: Normal pulses.      Heart sounds: Normal heart sounds. No murmur heard.  Pulmonary:      Effort: Pulmonary effort is normal. No respiratory distress.      Breath sounds: Normal breath sounds. No wheezing or rhonchi.   Abdominal:      General: Bowel sounds are normal.      Palpations: Abdomen is soft.   Musculoskeletal:         General: Tenderness (Mild tenderness to palpation of the mid to lower left calf.  No deformity of the calf or  Achilles tendon appreciated) present. No deformity.      Cervical back: Normal range of motion. No rigidity.      Comments: Mild swelling to the distal calf and ankle, left ankle.  There is a some settling blood just above the medial and lateral malleolus.  Plantar and dorsiflexion is intact.  Pulses are weak but palpable in bilateral feet.  Cap refill approximately 2 to 3 seconds bilaterally.   Skin:     Capillary Refill: Capillary refill takes less than 2 seconds.   Neurological:      General: No focal deficit present.      Mental Status: He is alert and oriented to person, place, and time.      Comments: Baseline decreased sensation bilateral feet   Psychiatric:         Mood and Affect: Mood normal.         Behavior: Behavior normal.         Thought Content: Thought content normal.         Procedures           ED Course      XR Tibia Fibula 2 View Left    Result Date: 3/8/2023  XR TIBIA FIBULA 2 VW LEFT Date of Exam: 3/8/2023 11:53 AM EST Indication: trauma, pain. Comparison: None available. Findings: The bony elements appear intact and in normal alignment. No fractures are identified. There are microsurgical clips present projected over the proximal third of the calf. No other foreign bodies are identified.     Impression: 1. No fractures are identified. No acute findings Electronically Signed: Serjio You  3/8/2023 12:32 PM EST  Workstation ID: [id number]    XR Chest 1 View    Result Date: 3/8/2023  XR CHEST 1 VW Date of Exam: 3/8/2023 11:53 AM EST Indication: Chest Pain Protocol. Comparison: 9/1/2020 Findings: Status post median sternotomy. Dual lead pacemaker. Cardiomegaly status post median sternotomy. Lungs are clear bilaterally.     Impression: 1. Cardiomegaly with clear lungs. Electronically Signed: Rm Moon  3/8/2023 12:37 PM EST  Workstation ID: [id number]    Duplex Venous Lower Extremity - Left CAR    Result Date: 3/8/2023  •  The left lower extremity venous duplex scan is negative for DVT and SVT.  "            XR Tibia Fibula 2 View Left   Final Result   Impression:      1. No fractures are identified. No acute findings      Electronically Signed: Serjio You     3/8/2023 12:32 PM EST     Workstation ID: [id number]      XR Chest 1 View   Final Result   Impression:      1. Cardiomegaly with clear lungs.      Electronically Signed: Rm Moon     3/8/2023 12:37 PM EST     Workstation ID: [id number]        Vitals:    03/08/23 1300 03/08/23 1344 03/08/23 1500 03/08/23 1530   BP: 152/97  150/98 172/93   BP Location:       Patient Position:       Pulse: 61  64 80   Resp:       Temp:       TempSrc:       SpO2: 98%  99% 100%   Weight:  97.5 kg (215 lb)     Height:  172.7 cm (68\")       Medications - No data to display  ECG/EMG Results (last 24 hours)     ** No results found for the last 24 hours. **        ECG 12 Lead Chest Pain   Final Result   Test Reason : Chest Pain   Blood Pressure :   */*   mmHG   Vent. Rate :  62 BPM     Atrial Rate :  62 BPM      P-R Int : 182 ms          QRS Dur : 114 ms       QT Int : 416 ms       P-R-T Axes :  23  29 135 degrees      QTc Int : 422 ms      Normal sinus rhythm   Possible Anterior infarct , age undetermined   ST & T wave abnormality, consider lateral ischemia   Abnormal ECG   When compared with ECG of 08-MAR-2023 11:58, (Unconfirmed)   premature atrial complexes are no longer present   Confirmed by MD MARIN CORY ([id number]) on 3/10/2023 1:54:09 AM      Referred By: EDMD           Confirmed By: JAK MARIN MD      ECG 12 Lead Chest Pain   Final Result   Test Reason : Chest Pain   Blood Pressure :   */*   mmHG   Vent. Rate :  65 BPM     Atrial Rate :  65 BPM      P-R Int : 186 ms          QRS Dur : 110 ms       QT Int : 422 ms       P-R-T Axes :  31  44 139 degrees      QTc Int : 438 ms      Sinus rhythm with premature atrial complexes   T wave abnormality, consider lateral ischemia   Abnormal ECG   When compared with ECG of 01-SEP-2020 14:20,   Sinus rhythm has replaced Electronic " atrial pacemaker   Nonspecific T wave abnormality no longer evident in Anterior leads   Confirmed by MD MARIN CORY ([id number]) on 3/10/2023 1:53:57 AM      Referred By: EDMD           Confirmed By: JAK MARIN MD            Medical Decision Making  Workup is reassuring.  Pt has reliable outpatient follow up and understands to have a low threshold to return to the ED if symptoms persist, worsen, or other concerns arise.    Edema of left lower leg: complicated acute illness or injury  Injury of left lower leg, initial encounter: complicated acute illness or injury  Left arm pain: complicated acute illness or injury  Amount and/or Complexity of Data Reviewed  Labs: ordered. Decision-making details documented in ED Course.  Radiology: ordered and independent interpretation performed. Decision-making details documented in ED Course.  ECG/medicine tests: ordered and independent interpretation performed. Decision-making details documented in ED Course.          Final diagnoses:   Injury of left lower leg, initial encounter   Left arm pain   Edema of left lower leg       ED Disposition  ED Disposition     ED Disposition   Discharge    Condition   Stable    Comment   --           DISCHARGE    Patient discharged in stable condition.    Reviewed implications of results, diagnosis, meds, responsibility to follow up, warning signs and symptoms of possible worsening, potential complications and reasons to return to ER.    Patient/Family voiced understanding of above instructions.    Discussed plan for discharge, as there is no emergent indication for admission.  Pt/family is agreeable and understands need for follow up and possible repeat testing.  Pt/family is aware that discharge does not mean that nothing is wrong but that it indicates no emergency is currently present that requires admission and they must continue care with follow-up as given below or with a physician of their choice.     FOLLOW-UP  Wendy Allen,  APRN  [address] [facility] [address]  [phone]    Schedule an appointment as soon as possible for a visit       [facility]  [address] [facility] [address] [phone]    If symptoms worsen    Mekhi Srivastava MD  [address]   [facility] [id number] [phone]      As needed    Sherman Banerjee MD  [address]   [facility] [id number] [phone]      As needed    Hawk Cross MD  [address] [facility] [address]  [place] [id number] [phone]    Schedule an appointment as soon as possible for a visit            Medication List      Changed    aspirin 81 MG tablet  Take 1 tablet by mouth Daily.  What changed: when to take this               Jesus Rangel DO  03/29/23 0117

## 2023-03-10 LAB
QT INTERVAL: 416 MS
QT INTERVAL: 422 MS
QTC INTERVAL: 422 MS
QTC INTERVAL: 438 MS

## 2023-06-12 ENCOUNTER — OFFICE VISIT (OUTPATIENT)
Dept: CARDIOLOGY | Facility: CLINIC | Age: 68
End: 2023-06-12
Payer: MEDICARE

## 2023-06-12 VITALS
HEIGHT: 69 IN | SYSTOLIC BLOOD PRESSURE: 116 MMHG | BODY MASS INDEX: 32.73 KG/M2 | DIASTOLIC BLOOD PRESSURE: 78 MMHG | WEIGHT: 221 LBS | OXYGEN SATURATION: 98 % | HEART RATE: 70 BPM

## 2023-06-12 DIAGNOSIS — Z99.89 OSA ON CPAP: ICD-10-CM

## 2023-06-12 DIAGNOSIS — I50.32 CHRONIC DIASTOLIC HEART FAILURE: ICD-10-CM

## 2023-06-12 DIAGNOSIS — I49.5 CHRONOTROPIC INCOMPETENCE WITH SINUS NODE DYSFUNCTION: Primary | ICD-10-CM

## 2023-06-12 DIAGNOSIS — I10 ESSENTIAL HYPERTENSION: ICD-10-CM

## 2023-06-12 DIAGNOSIS — G47.33 OSA ON CPAP: ICD-10-CM

## 2023-06-12 DIAGNOSIS — Z95.0 PRESENCE OF PERMANENT CARDIAC PACEMAKER: ICD-10-CM

## 2023-06-12 RX ORDER — GABAPENTIN 300 MG/1
300 CAPSULE ORAL 2 TIMES DAILY
Qty: 90 CAPSULE | Refills: 11 | COMMUNITY
Start: 2023-03-16 | End: 2024-03-15

## 2023-06-12 RX ORDER — DULOXETIN HYDROCHLORIDE 60 MG/1
60 CAPSULE, DELAYED RELEASE ORAL DAILY
COMMUNITY

## 2023-06-12 RX ORDER — TIRZEPATIDE 5 MG/.5ML
5 INJECTION, SOLUTION SUBCUTANEOUS
COMMUNITY
Start: 2023-05-16

## 2023-06-12 NOTE — PROGRESS NOTES
Cardiac Electrophysiology Outpatient Follow Up Note            [facility] at [facility]    Follow Up Office Visit      Gen Torres  [id number]  06/12/2023  [unfilled]  [unfilled]    Primary Care Physician: Wendy [facility] APRN    Referred By: No ref. provider found    Subjective     Chief Complaint:   Diagnoses and all orders for this visit:    1. Chronotropic incompetence with sinus node dysfunction (Primary)    2. Presence of permanent cardiac pacemaker    3. KAYLEIGH on CPAP    4. Essential hypertension    5. Chronic diastolic heart failure      Chief Complaint   Patient presents with   • chronotropic incompetence with sinus node dysfunction        History of Present Illness:   Gen Torres is a 68 y.o. male who presents to my electrophysiology clinic for follow up of above complaints.  Doing well.  Mr. Torres is here with his wife.  She says that he is getting along very nicely at home.    No chest pain nausea vomit fevers or chills.  No heart failure symptoms..      Past Medical History:   Past Medical History:   Diagnosis Date   • Absent left knee reflex     His wife showed a concern about absent knee reflexes and according to the patient this could have been attributed to his severe back related problem.    • CAD (coronary artery disease)     CABG   • CHF (congestive heart failure)    • Diabetes mellitus     checsk sugar once per week    • ED (erectile dysfunction)     Further history revealed that he also has erectile dysfunction which he attributed may be due to s/p prostatic cancer.   • GERD (gastroesophageal reflux disease)    • Headache    • Hyperlipidemia    • Hypertension    • Lower extremity edema      This seems to be fairly resolved at this point.    • Myocardial infarction     x2 1994 then [id number] and had bypass after    • Neuropathy    • Obesity    • KAYLEIGH on CPAP     cpap- compliant with machine    • Prostate cancer    • SOB (shortness of breath)        During his first visit, the patient mentioned having very occasional shortness of air which he correlates with gaining weight.  This seems to be fairly resolved at this point.    • Wears glasses     readers       Past Surgical History:   Past Surgical History:   Procedure Laterality Date   • CARDIAC CATHETERIZATION     • CARDIAC CATHETERIZATION N/A 2018    Procedure: Left Heart Cath;  Surgeon: Hari Vargas MD;  Location:  [facility];  Service: Cardiovascular   • CARDIAC CATHETERIZATION N/A 2018    Procedure: Coronary angiography;  Surgeon: Hari Vargas MD;  Location:  [facility];  Service: Cardiovascular   • CARDIAC CATHETERIZATION N/A 10/22/2019    Procedure: Left Heart Cath;  Surgeon: Hari Vargas MD;  Location:  [facility];  Service: Cardiology   • CARDIAC ELECTROPHYSIOLOGY PROCEDURE N/A 2020    Procedure: DDD PPM Implant ([facility] [place]  Surgeon: Sandeep Marina DO;  Location:  [facility] EP INVASIVE LOC[facility];  Service: Cardiology;  Laterality: N/A;   • CARDIAC SURGERY     • COLONOSCOPY     • CORONARY ARTERY BYPASS GRAFT      x5 vessles    • CORONARY STENT PLACEMENT  17   • ENDOSCOPY     • INSERT / REPLACE / REMOVE PACEMAKER     • PROSTATECTOMY      Status post radical prostatectomy for carcinoma.   • WISDOM TOOTH EXTRACTION      1 remain pt thinks        Family History:   Family History   Problem Relation Age of Onset   • Heart attack Father    • Heart disease Father    • Cancer Father    • Cancer Mother    • Heart disease Sister    • Diabetes Sister    • Heart disease Brother        Social History:   Social History     Socioeconomic History   • Marital status:    Tobacco Use   • Smoking status: Former     Packs/day: 4.00     Years: 26.00     Pack years: 104.00     Types: Cigarettes     Start date: 1968     Quit date: 1994     Years since quittin.4   • Smokeless tobacco: Never   Vaping Use   • Vaping Use: Never used    Substance and Sexual Activity   • Alcohol use: No   • Drug use: No   • Sexual activity: Not Currently     Partners: Female       Medications:     Current Outpatient Medications:   •  aspirin 81 MG tablet, Take 1 tablet by mouth Daily. (Patient taking differently: Take 1 tablet by mouth Every Night.), Disp: 90 tablet, Rfl: 3  •  atorvastatin (LIPITOR) 80 MG tablet, Take 1 tablet by mouth Every Night., Disp: 90 tablet, Rfl: 3  •  carvedilol (COREG) 12.5 MG tablet, Take 1 tablet by mouth 2 (Two) Times a Day With Meals., Disp: 180 tablet, Rfl: 3  •  DULoxetine (CYMBALTA) 30 MG capsule, Take 1 capsule by mouth Daily., Disp: , Rfl:   •  DULoxetine (CYMBALTA) 60 MG capsule, Take 1 capsule by mouth Daily., Disp: , Rfl:   •  fexofenadine (ALLEGRA) 180 MG tablet, Take 1 tablet by mouth Daily., Disp: , Rfl:   •  furosemide (LASIX) 20 MG tablet, TAKE 1 TABLET BY MOUTH ONCE DAILY AS NEEDED FOR SWELLING, Disp: 60 tablet, Rfl: 3  •  gabapentin (NEURONTIN) 300 MG capsule, Take 1 capsule by mouth 2 (Two) Times a Day., Disp: 90 capsule, Rfl: 11  •  losartan (COZAAR) 100 MG tablet, Take 1 tablet by mouth Every Night., Disp: 90 tablet, Rfl: 3  •  meloxicam (MOBIC) 15 MG tablet, Take 1 tablet by mouth Daily., Disp: , Rfl:   •  Mounjaro 5 MG/0.5ML solution pen-injector, Inject 5 mg under the skin into the appropriate area as directed Every 7 (Seven) Days., Disp: , Rfl:   •  omeprazole (priLOSEC) 20 MG capsule, Take 1 capsule by mouth Daily., Disp: , Rfl:   •  potassium chloride (K-DUR,KLOR-CON) 20 MEQ CR tablet, TAKE 1 TABLET BY MOUTH ONCE DAILY WHEN TAKING FUROSEMIDE, Disp: 30 tablet, Rfl: 3  •  metFORMIN (GLUCOPHAGE) 500 MG tablet, Take 1 tablet by mouth 2 (Two) Times a Day., Disp: , Rfl: 0    Allergies:   Allergies   Allergen Reactions   • Contrast Dye (Echo Or Unknown Ct/Mr) Shortness Of Breath     Hives, itching   • Other Shortness Of Breath     Hives, itching   • Iodinated Contrast Media Unknown - Low Severity   • Iodine Itching  "and Rash   • Spironolactone Diarrhea     He was not able to tolerate the spironolactone secondary to diarrhea.       Objective   Vital Signs:   Vitals:    06/12/23 0910   BP: 116/78   BP Location: Left arm   Patient Position: Sitting   Pulse: 70   SpO2: 98%   Weight: 100 kg (221 lb)   Height: 175.3 cm (69\")       PHYSICAL EXAM  General appearance: Awake, alert, cooperative  Head: Normocephalic, without obvious abnormality, atraumatic  Eyes: Conjunctivae/corneas clear, EOMs intact  Neck: no adenopathy, no carotid bruit, no JVD and thyroid: not enlarged  Lungs: clear to auscultation bilaterally and no rhonchi or crackles\", ' symmetric  Heart: regular rate and rhythm, S1, S2 normal, no murmur, click, rub or gallop  Abdomen: Soft, non-tender, bowel sounds normal,  no organomegaly  Extremities: extremities normal, atraumatic, no cyanosis or edema  Skin: Skin color, turgor normal, no rashes or lesions  Neurologic: Grossly normal     Lab Results   Component Value Date    GLUCOSE 98 03/08/2023    CALCIUM 9.1 03/08/2023     03/08/2023    K 4.3 03/08/2023    CO2 28.0 03/08/2023     03/08/2023    BUN 12 03/08/2023    CREATININE 0.72 (L) 03/08/2023    EGFRIFNONA 78 09/01/2020    BCR 16.7 03/08/2023    ANIONGAP 10.0 03/08/2023     Lab Results   Component Value Date    WBC 5.71 03/08/2023    HGB 15.2 03/08/2023    HCT 43.0 03/08/2023    MCV 96.4 03/08/2023     (L) 03/08/2023     Lab Results   Component Value Date    INR 1.01 05/08/2018    INR 1.04 01/02/2015    PROTIME 10.6 05/08/2018    PROTIME 11.1 01/02/2015     No results found for: TSH, V5WUNDD, L8ZTHME, THYROIDAB    Cardiac Testing:     I personally viewed and interpreted the patient's EKG/Telemetry/lab data    Procedures    Tobacco Cessation: N/A  Obstructive Sleep Apnea Screening: Completed    Advance Care Planning   ACP discussion was declined by the patient. Patient does not have an advance directive, declines further assistance.       Assessment & " Plan    Diagnoses and all orders for this visit:    1. Chronotropic incompetence with sinus node dysfunction (Primary)    2. Presence of permanent cardiac pacemaker    3. KAYLEIGH on CPAP    4. Essential hypertension    5. Chronic diastolic heart failure         Diagnosis Plan   1. Chronotropic incompetence with sinus node dysfunction   feels great.  Chronotropic competency restored with atrial pacing support.      2. Presence of permanent cardiac pacemaker   appropriate pacing sensing and impedance values.    Atrial pacing 42%.    Excellent remaining battery longevity.      This patient's Cardiac Implanted Electronic Device was manually interrogated and reprogrammed during the patient encounter today.  Iterative programming changes were manually made to determine the sensing threshold, pacing threshold, lead impedance as well as underlying cardiac rhythm.  These programming changes were not limited to but included some or all of the following when appropriate: pacing mode, programmed AV delays, blanking periods, and refractory periods.  Data obtained as a result of these manual programing changes informed the patient's CIED permanent programming.      3. KAYLEIGH on CPAP   CPAP compliant.  Doing well.      4. Essential hypertension   blood pressure well controlled.      5. Chronic diastolic heart failure   not an issue.  Euvolemic.        Body mass index is 32.64 kg/m².    I spent 36 minutes in consultation with this patient which included more than 65% of this time in direct face-to-face counseling, physical examination and discussion of my assessment and findings and this shared decision making with the patient.  The remainder of the time not spent face-to-face was performing one, some or all of the following actions: preparing to see the patient (e.g. reviewing tests, prior clinicians' notes, etc), ordering medications, tests or procedures, coordination of care, discussion of the plan with other healthcare providers,  documenting clinical information in epic as well as independently interpreting results and communication of these results to the patient family and/or caregiver(s).  Please note that this explicitly excludes time spent on other separate billable services such as performing procedures or test interpretation, when applicable.      Follow Up:       Thank you for allowing me to participate in the care of your patient. Please to not hesitate to contact me with additional questions or concerns.      Sandeep Marina DO, FACC, RS  Cardiac Electrophysiologist  [facility] / [facility]

## 2023-08-22 ENCOUNTER — TELEPHONE (OUTPATIENT)
Dept: CARDIOLOGY | Facility: CLINIC | Age: 68
End: 2023-08-22
Payer: MEDICARE

## 2023-08-22 NOTE — TELEPHONE ENCOUNTER
Spoke with Mr Torres asking him to send a manual transmission form his [employer] monitor. He was not home but stated he would call me tomorrow.

## 2023-12-14 ENCOUNTER — TELEPHONE (OUTPATIENT)
Dept: CARDIOLOGY | Facility: CLINIC | Age: 68
End: 2023-12-14
Payer: MEDICARE

## 2023-12-14 NOTE — TELEPHONE ENCOUNTER
Left message letting patient know that his bedside home monitor isn't connecting. Asked him to make sure the monitor is plugged in and to try to send in a manual reading.    Left my name and number should he need assistance.

## 2024-01-04 ENCOUNTER — TELEPHONE (OUTPATIENT)
Dept: CARDIOLOGY | Facility: CLINIC | Age: 69
End: 2024-01-04
Payer: MEDICARE

## 2024-02-19 ENCOUNTER — OFFICE VISIT (OUTPATIENT)
Dept: CARDIOLOGY | Facility: CLINIC | Age: 69
End: 2024-02-19
Payer: MEDICARE

## 2024-02-19 VITALS
HEIGHT: 70 IN | WEIGHT: 207 LBS | OXYGEN SATURATION: 97 % | HEART RATE: 63 BPM | SYSTOLIC BLOOD PRESSURE: 108 MMHG | DIASTOLIC BLOOD PRESSURE: 72 MMHG | BODY MASS INDEX: 29.63 KG/M2

## 2024-02-19 DIAGNOSIS — I10 ESSENTIAL HYPERTENSION: ICD-10-CM

## 2024-02-19 DIAGNOSIS — I49.5 CHRONOTROPIC INCOMPETENCE WITH SINUS NODE DYSFUNCTION: ICD-10-CM

## 2024-02-19 DIAGNOSIS — I25.118 CORONARY ARTERY DISEASE OF NATIVE ARTERY OF NATIVE HEART WITH STABLE ANGINA PECTORIS: Primary | ICD-10-CM

## 2024-02-19 DIAGNOSIS — I50.32 CHRONIC DIASTOLIC HEART FAILURE: ICD-10-CM

## 2024-02-19 PROCEDURE — 1159F MED LIST DOCD IN RCRD: CPT | Performed by: HOSPITALIST

## 2024-02-19 PROCEDURE — 93000 ELECTROCARDIOGRAM COMPLETE: CPT | Performed by: HOSPITALIST

## 2024-02-19 PROCEDURE — 3074F SYST BP LT 130 MM HG: CPT | Performed by: HOSPITALIST

## 2024-02-19 PROCEDURE — 3078F DIAST BP <80 MM HG: CPT | Performed by: HOSPITALIST

## 2024-02-19 PROCEDURE — 99213 OFFICE O/P EST LOW 20 MIN: CPT | Performed by: HOSPITALIST

## 2024-02-19 PROCEDURE — 1160F RVW MEDS BY RX/DR IN RCRD: CPT | Performed by: HOSPITALIST

## 2024-02-19 NOTE — PROGRESS NOTES
[facility] AT [facility]   [address], [address]  [place], [place], [address] [phone]  [email or web address]           OUTPATIENT CLINIC FOLLOW UP NOTE    Patient Care Team:  Patient Care Team:  Wendy [facility] APRN as PCP - General    Subjective:   Reason for consultation:   Chief Complaint   Patient presents with    Coronary Artery Disease     Coronary artery disease of native artery of native heart with stable angina pectoris           HPI:    Gen Torres is a 68 y.o. male.  Cardiac problem list:  Multivessel CAD   Remote stenting  Status post 5 vessel bypass (LIMA to LAD and diag, SVG to PDA, SVG to OM )  ANNA SVG to OM, 2018  No new obstructive disease on [facility] 2019  Chronic diastolic heart failure  Sick sinus syndrome status post DC PPM, BS, Dr. Marina, 8/2020  PVCs  9% burden on heart monitor 2/2020  Persistent shortness of breath of unknown etiology  Patent bypass grafts on heart cath 10/2019  Unremarkable chest x-ray, PFTs 11/2019  Relative bradycardia  Essential hypertension  Mixed hyperlipidemia  Diabetes.      He presents today for follow-up. Has been doing well from a cardiac standpoint since his last visit.  Denies chest pain, shortness of breath, palpitations, lower extremity edema, lightheadedness or syncope. Had blood work with PCP 2 weeks ago.     Review of Systems:  As noted in the HPI    PFSH:  Patient Active Problem List   Diagnosis    Obesity    Diabetes mellitus    Mixed hyperlipidemia    Essential hypertension    KAYLEIGH on CPAP    ED (erectile dysfunction)    Chronic diastolic heart failure    Coronary artery disease of native artery of native heart with stable angina pectoris    Dyspnea on exertion    Fatigue    PVC (premature ventricular contraction)    Chronotropic incompetence with sinus node dysfunction    Presence of permanent cardiac pacemaker         Current Outpatient Medications:     aspirin 81 MG tablet, Take 1 tablet by mouth Daily. (Patient  "taking differently: Take 1 tablet by mouth Every Night.), Disp: 90 tablet, Rfl: 3    atorvastatin (LIPITOR) 80 MG tablet, Take 1 tablet by mouth Every Night., Disp: 90 tablet, Rfl: 3    carvedilol (COREG) 12.5 MG tablet, Take 1 tablet by mouth 2 (Two) Times a Day With Meals., Disp: 180 tablet, Rfl: 3    DULoxetine (CYMBALTA) 30 MG capsule, Take 1 capsule by mouth Daily., Disp: , Rfl:     DULoxetine (CYMBALTA) 60 MG capsule, Take 1 capsule by mouth Daily., Disp: , Rfl:     furosemide (LASIX) 20 MG tablet, TAKE 1 TABLET BY MOUTH ONCE DAILY AS NEEDED FOR SWELLING, Disp: 60 tablet, Rfl: 3    losartan (COZAAR) 100 MG tablet, Take 1 tablet by mouth Every Night., Disp: 90 tablet, Rfl: 3    meloxicam (MOBIC) 15 MG tablet, Take 1 tablet by mouth Daily., Disp: , Rfl:     Mounjaro 5 MG/0.5ML solution pen-injector, Inject 1 mL under the skin into the appropriate area as directed Every 7 (Seven) Days., Disp: , Rfl:     omeprazole (priLOSEC) 20 MG capsule, Take 1 capsule by mouth Daily., Disp: , Rfl:     potassium chloride (K-DUR,KLOR-CON) 20 MEQ CR tablet, TAKE 1 TABLET BY MOUTH ONCE DAILY WHEN TAKING FUROSEMIDE, Disp: 30 tablet, Rfl: 3    Allergies   Allergen Reactions    Contrast Dye (Echo Or Unknown Ct/Mr) Shortness Of Breath     Hives, itching    Other Shortness Of Breath     Hives, itching    Iodinated Contrast Media Unknown - Low Severity    Iodine Itching and Rash    Spironolactone Diarrhea     He was not able to tolerate the spironolactone secondary to diarrhea.        reports that he quit smoking about 30 years ago. His smoking use included cigarettes. He started smoking about 56 years ago. He has a 104.00 pack-year smoking history. He has been exposed to tobacco smoke. He has never used smokeless tobacco.      Objective:   Blood pressure 108/72, pulse 63, height 177.8 cm (70\"), weight 93.9 kg (207 lb), SpO2 97%.  CONSTITUTIONAL: No acute distress, normal affect  CARDIOVASCULAR: Regular rate and rhythm with normal S1 " and S2. Without gallop or rub.  Systolic ejection murmur at the right upper sternal border, no radiation  PERIPHERAL VASCULAR: Normal radial pulses bilaterally.  No carotid bruit bilaterally     Labs:    Lab Results   Component Value Date    CHOL 171 10/22/2019    TRIG 115 10/22/2019    HDL 31 (L) 10/22/2019    CREATININE 0.72 (L) 03/08/2023       Diagnostic Data:      ECG 12 Lead    Date/Time: 2/19/2024 1:31 PM  Performed by: Keesha Gillespie APRN    Authorized by: Keesha Gillespie APRN  Comparison: compared with previous ECG from 3/8/2023  Rhythm: sinus rhythm  Ectopy: infrequent PVCs  Rate: normal  BPM: 63  Conduction: non-specific intraventricular conduction delay  Other findings: T wave abnormality          [employer] heart monitor 2/2020: PVC burden 9%, symptomatic PVCs on occasion    [facility] 5/2018  There was severe multivessel coronary artery disease involving a 80% diffuse proximal LAD stenosis, 100%  of the left circumflex artery, 100%  of the right coronary artery.  There is a patent sequential LIMA to diagonal branch and LAD, patent sequential SVG to a diagonal branch and to a large OM, and a patent SVG to the RPDA.  There was an 80% discrete stenosis just distal to the SVG to OM anastomosis that was found to be functionally significant with an iFR ratio 0.87.  This lesion is now status post intervention with a Xience Alpine 2.75 x 12 mm drug-eluting stent.    Stress test 10/2019  Left ventricular ejection fraction is normal (Calculated EF = 51%).  There were occasional PVCs throughout the study.  Myocardial perfusion imaging indicates a large-sized infarct located in the lateral wall and inferior wall with mild ivonne-infarct ischemia.  Impressions are consistent with an intermediate risk study.    [facility] 10/2019  There is severe multivessel coronary artery disease with widely patent 5 vessel bypass as well as a previously placed stent  There is no new significant change in the coronary anatomy when compared to  the postintervention angiographic images from May 9, 2018  Low normal left ventricular ejection fraction 50%      Results for orders placed during the hospital encounter of 09/30/19    Adult Transthoracic Echo Complete W/ Cont if Necessary Per Protocol    Interpretation Summary  · Left ventricular systolic function is normal. Estimated EF appears to be in the range of 51 - 55%.  · Left ventricular wall thickness is consistent with mild-to-moderate concentric hypertrophy.  · The following left ventricular wall segments are hypokinetic: mid inferolateral, apical inferior and mid inferior.  · Left ventricular diastolic dysfunction (grade I a) consistent with impaired relaxation.  · Left atrial cavity size is moderately dilated.  · Mild mitral valve regurgitation is present    Assessment and Plan:     Coronary artery disease involving native coronary artery of native heart without angina pectoris  Mixed hyperlipidemia  -Currently without angina.   -Continue aspirin, carvedilol, atorvastatin  -Requesting last CMP and lipid panel from PCP office 2 weeks ago.     Chronic diastolic heart failure  Lower extremity swelling  -EF 55% 5/2018  -Continue beta-blocker and losartan  -Continue Lasix as needed, to be taken with concomitant potassium     PVCs, 9% burden  Sick sinus syndrome status post pacemaker  -Follow up with EP  -Continue beta-blocker    Essential hypertension  -Continue current medications.     - Return in about 1 year (around 2/19/2025) for Next scheduled follow up with EKG .      Electronically signed by GEOFF Feliz, 02/19/24, 1:32 PM EST.

## 2024-06-14 ENCOUNTER — TELEPHONE (OUTPATIENT)
Dept: CARDIOLOGY | Facility: CLINIC | Age: 69
End: 2024-06-14

## 2024-06-14 NOTE — TELEPHONE ENCOUNTER
Name: Gen Torres    Relationship: Self    Best Callback Number: [phone]    Incoming call to the Hub, requesting to  RESCHEDULE their Other: [facility]  appointment on 6-17-24.     Per Hub workflow, further review is needed before the task can be completed.    Result of Call: Transferred to [facility] the [facility]

## 2024-06-28 NOTE — PROGRESS NOTES
Cardiac Electrophysiology Outpatient Follow Up Note            [facility] at [facility]    Follow Up Office Visit      Gen Torres  [id number]      Primary Care Physician: Wendy Allen APRN      Subjective     Chief Complaint:   There are no diagnoses linked to this encounter.    Chief Complaint   Patient presents with    Coronary artery disease of native artery of native heart wi       History of Present Illness:   Gen Torres is a 69 y.o. male who presents to  electrophysiology clinic for follow up of above complaints.He was last seen by Dr. Vargas 2/2024.   Since we last saw the pt, pt denies any palpitation episodes, worsening SOB, CP, LH, and dizziness. Denies any hospitalizations, ER visits, bleeding, or TIA/CVA symptoms. Overall feels well.  He is working outside doing excavating.  He has had some left lower extremity neuropathy symptoms he is going to see his PCP regarding.  He has no symptoms suggest claudication.  He is tolerating his medications well.   Cardiac problem list:  Multivessel CAD   Remote stenting  Status post 5 vessel bypass (LIMA to LAD and diag, SVG to PDA, SVG to OM )  ANNA SVG to OM, 2018  No new obstructive disease on LHC 2019  Chronic diastolic heart failure  Sick sinus syndrome status post DC PPM, BS, Dr. Marina, 8/2020  PVCs  9% burden on heart monitor 2/2020  Persistent shortness of breath of unknown etiology  Patent bypass grafts on heart cath 10/2019  Unremarkable chest x-ray, PFTs 11/2019  Relative bradycardia  Essential hypertension  Mixed hyperlipidemia  Diabetes.          Past Medical History:   Past Medical History:   Diagnosis Date    Absent left knee reflex     His wife showed a concern about absent knee reflexes and according to the patient this could have been attributed to his severe back related problem.     CAD (coronary artery disease)     CABG    CHF (congestive heart failure)     Diabetes mellitus     checsk sugar  once per week     ED (erectile dysfunction)     Further history revealed that he also has erectile dysfunction which he attributed may be due to s/p prostatic cancer.    GERD (gastroesophageal reflux disease)     Headache     Hyperlipidemia     Hypertension     Lower extremity edema      This seems to be fairly resolved at this point.     Myocardial infarction     x2 1994 then [id number] and had bypass after     Neuropathy     Obesity     KAYLEIGH on CPAP     cpap- compliant with machine     Prostate cancer     SOB (shortness of breath)       During his first visit, the patient mentioned having very occasional shortness of air which he correlates with gaining weight.  This seems to be fairly resolved at this point.     Wears glasses     readers       Past Surgical History:   Past Surgical History:   Procedure Laterality Date    CARDIAC CATHETERIZATION      CARDIAC CATHETERIZATION N/A 05/09/2018    Procedure: Left Heart Cath;  Surgeon: Hari Vargas MD;  Location:  [facility];  Service: Cardiovascular    CARDIAC CATHETERIZATION N/A 05/09/2018    Procedure: Coronary angiography;  Surgeon: Hari Vargas MD;  Location:  [facility];  Service: Cardiovascular    CARDIAC CATHETERIZATION N/A 10/22/2019    Procedure: Left Heart Cath;  Surgeon: Hari Vargas MD;  Location:  [facility];  Service: Cardiology    CARDIAC ELECTROPHYSIOLOGY PROCEDURE N/A 09/01/2020    Procedure: DDD PPM Implant ([facility]);  Surgeon: Sandeep Marina DO;  Location:  [facility] EP INVASIVE LOCATION;  Service: Cardiology;  Laterality: N/A;    CARDIAC SURGERY      COLONOSCOPY      CORONARY ARTERY BYPASS GRAFT      x5 vessles     CORONARY STENT PLACEMENT  01/01/17    ENDOSCOPY      INSERT / REPLACE / REMOVE PACEMAKER  2019    PROSTATECTOMY      Status post radical prostatectomy for carcinoma.    WISDOM TOOTH EXTRACTION      1 remain pt thinks        Family History:   Family History   Problem Relation Age of Onset     Heart attack Father     Heart disease Father     Cancer Father     Cancer Mother     Heart disease Sister     Diabetes Sister     Heart disease Brother        Social History:   Social History     Socioeconomic History    Marital status:    Tobacco Use    Smoking status: Former     Current packs/day: 0.00     Average packs/day: 4.0 packs/day for 26.0 years (104.0 ttl pk-yrs)     Types: Cigarettes     Start date: 1968     Quit date: 1994     Years since quittin.5     Passive exposure: Past    Smokeless tobacco: Never   Vaping Use    Vaping status: Never Used   Substance and Sexual Activity    Alcohol use: No    Drug use: No    Sexual activity: Not Currently     Partners: Female       Medications:     Current Outpatient Medications:     aspirin 81 MG tablet, Take 1 tablet by mouth Daily. (Patient taking differently: Take 1 tablet by mouth Every Night.), Disp: 90 tablet, Rfl: 3    atorvastatin (LIPITOR) 80 MG tablet, Take 1 tablet by mouth Every Night., Disp: 90 tablet, Rfl: 3    carvedilol (COREG) 12.5 MG tablet, Take 1 tablet by mouth 2 (Two) Times a Day With Meals., Disp: 180 tablet, Rfl: 3    DULoxetine (CYMBALTA) 30 MG capsule, Take 1 capsule by mouth Daily., Disp: , Rfl:     DULoxetine (CYMBALTA) 60 MG capsule, Take 1 capsule by mouth Daily., Disp: , Rfl:     furosemide (LASIX) 20 MG tablet, TAKE 1 TABLET BY MOUTH ONCE DAILY AS NEEDED FOR SWELLING, Disp: 60 tablet, Rfl: 3    levocetirizine (XYZAL) 5 MG tablet, Take 1 tablet by mouth Every Evening., Disp: , Rfl:     losartan (COZAAR) 100 MG tablet, Take 1 tablet by mouth Every Night., Disp: 90 tablet, Rfl: 3    meloxicam (MOBIC) 15 MG tablet, Take 1 tablet by mouth Daily., Disp: , Rfl:     Mounjaro 5 MG/0.5ML solution pen-injector, Inject 1 mL under the skin into the appropriate area as directed Every 7 (Seven) Days., Disp: , Rfl:     nitroglycerin (NITROSTAT) 0.4 MG SL tablet, Place 1 tablet under the tongue Every 5 (Five) Minutes As Needed.,  "Disp: , Rfl:     omeprazole (priLOSEC) 20 MG capsule, Take 1 capsule by mouth Daily., Disp: , Rfl:     potassium chloride (K-DUR,KLOR-CON) 20 MEQ CR tablet, TAKE 1 TABLET BY MOUTH ONCE DAILY WHEN TAKING FUROSEMIDE, Disp: 30 tablet, Rfl: 3    Allergies:   Allergies   Allergen Reactions    Contrast Dye (Echo Or Unknown Ct/Mr) Shortness Of Breath     Hives, itching    Other Shortness Of Breath     Hives, itching    Iodinated Contrast Media Unknown - Low Severity    Iodine Itching and Rash    Spironolactone Diarrhea     He was not able to tolerate the spironolactone secondary to diarrhea.       Objective   Vital Signs:   Vitals:    07/02/24 0819   BP: 122/82   Pulse: 66   SpO2: 97%   Weight: 89.7 kg (197 lb 12.8 oz)   Height: 172.7 cm (68\")         PHYSICAL EXAM  General appearance: Awake, alert, cooperative  Head: Normocephalic, without obvious abnormality, atraumatic  Eyes: Conjunctivae/corneas clear, EOMs intact  Neck: no adenopathy, no carotid bruit, no JVD and thyroid: not enlarged  Lungs: clear to auscultation bilaterally and no rhonchi or crackles\", ' symmetric  Heart: regular rate and rhythm, S1, S2 normal, no murmur, click, rub or gallop  Abdomen: Soft, non-tender, bowel sounds normal,  no organomegaly  Extremities: extremities normal, atraumatic, no cyanosis or edema  Skin: Skin color, turgor normal, no rashes or lesions  Neurologic: Grossly normal     Lab Results   Component Value Date    GLUCOSE 98 03/08/2023    CALCIUM 9.1 03/08/2023     03/08/2023    K 4.3 03/08/2023    CO2 28.0 03/08/2023     03/08/2023    BUN 12 03/08/2023    CREATININE 0.72 (L) 03/08/2023    EGFRIFNONA 78 09/01/2020    BCR 16.7 03/08/2023    ANIONGAP 10.0 03/08/2023     Lab Results   Component Value Date    WBC 5.71 03/08/2023    HGB 15.2 03/08/2023    HCT 43.0 03/08/2023    MCV 96.4 03/08/2023     (L) 03/08/2023     Lab Results   Component Value Date    INR 1.01 05/08/2018    INR 1.04 01/02/2015    PROTIME 10.6 " "05/08/2018    PROTIME 11.1 01/02/2015     No results found for: \"TSH\", \"B2AIMEW\", \"W7WRWQJ\", \"THYROIDAB\"    Cardiac Testing:   Device interrogation: [employer] dual-chamber pacemaker.  DDDR rate 60.  A paced 32%.  RV paced less than 1%.  Normal P wave R wave thresholds and impedances.  Battery voltage 6.5 years remaining.  Procedures    Tobacco Cessation: N/A  Obstructive Sleep Apnea Screening: Completed    Advance Care Planning   ACP discussion was declined by the patient. Patient does not have an advance directive, declines further assistance.       Assessment & Plan    There are no diagnoses linked to this encounter.       Diagnosis Plan   1. Chronotropic incompetence with sinus node dysfunction   . Chronotropic competency restored with atrial pacing support.  Acceptable pacemaker interrogation today.  He has no symptoms of shortness of breath or fatigue.  He is working hard with physical activities.      2. Presence of permanent cardiac pacemaker   Normal pacemaker function.  See interrogation as above.          3. KAYLEIGH on CPAP   CPAP compliant.  Doing well.      4. Essential hypertension   blood pressure well controlled.      5. Chronic diastolic heart failure/CAD Denies any angina symptoms.  Blood pressure is well-controlled he follows with Dr. Vargas regarding cardiovascular disease.          Stable CV course, plan for return follow-up Dr. Vargas March 2025 return follow-up or office in 1 year or sooner as needed.  Electronically signed by JULISA Mantilla, 07/02/24, 8:53 AM EDT.     "

## 2024-07-02 ENCOUNTER — OFFICE VISIT (OUTPATIENT)
Dept: CARDIOLOGY | Facility: CLINIC | Age: 69
End: 2024-07-02
Payer: MEDICARE

## 2024-07-02 VITALS
HEIGHT: 68 IN | SYSTOLIC BLOOD PRESSURE: 122 MMHG | OXYGEN SATURATION: 97 % | HEART RATE: 66 BPM | WEIGHT: 197.8 LBS | DIASTOLIC BLOOD PRESSURE: 82 MMHG | BODY MASS INDEX: 29.98 KG/M2

## 2024-07-02 DIAGNOSIS — Z95.0 PRESENCE OF PERMANENT CARDIAC PACEMAKER: ICD-10-CM

## 2024-07-02 DIAGNOSIS — I25.118 CORONARY ARTERY DISEASE OF NATIVE ARTERY OF NATIVE HEART WITH STABLE ANGINA PECTORIS: ICD-10-CM

## 2024-07-02 DIAGNOSIS — I49.3 PVC (PREMATURE VENTRICULAR CONTRACTION): ICD-10-CM

## 2024-07-02 DIAGNOSIS — I50.32 CHRONIC DIASTOLIC HEART FAILURE: Primary | ICD-10-CM

## 2024-07-02 RX ORDER — NITROGLYCERIN 0.4 MG/1
0.4 TABLET SUBLINGUAL
COMMUNITY
Start: 2024-02-27 | End: 2025-02-26

## 2024-07-02 RX ORDER — LEVOCETIRIZINE DIHYDROCHLORIDE 5 MG/1
1 TABLET, FILM COATED ORAL EVERY EVENING
COMMUNITY
Start: 2024-05-31

## 2025-03-10 ENCOUNTER — OFFICE VISIT (OUTPATIENT)
Dept: CARDIOLOGY | Facility: CLINIC | Age: 70
End: 2025-03-10
Payer: MEDICARE

## 2025-03-10 VITALS
WEIGHT: 219.2 LBS | HEART RATE: 60 BPM | DIASTOLIC BLOOD PRESSURE: 76 MMHG | BODY MASS INDEX: 32.47 KG/M2 | OXYGEN SATURATION: 96 % | SYSTOLIC BLOOD PRESSURE: 138 MMHG | HEIGHT: 69 IN

## 2025-03-10 DIAGNOSIS — I25.118 CORONARY ARTERY DISEASE OF NATIVE ARTERY OF NATIVE HEART WITH STABLE ANGINA PECTORIS: Primary | ICD-10-CM

## 2025-03-10 DIAGNOSIS — E78.2 MIXED HYPERLIPIDEMIA: ICD-10-CM

## 2025-03-10 DIAGNOSIS — I10 ESSENTIAL HYPERTENSION: ICD-10-CM

## 2025-03-10 PROCEDURE — 3075F SYST BP GE 130 - 139MM HG: CPT | Performed by: INTERNAL MEDICINE

## 2025-03-10 PROCEDURE — 3078F DIAST BP <80 MM HG: CPT | Performed by: INTERNAL MEDICINE

## 2025-03-10 PROCEDURE — 93000 ELECTROCARDIOGRAM COMPLETE: CPT | Performed by: INTERNAL MEDICINE

## 2025-03-10 PROCEDURE — 99214 OFFICE O/P EST MOD 30 MIN: CPT | Performed by: INTERNAL MEDICINE

## 2025-03-10 NOTE — PROGRESS NOTES
[facility]   [address], [address]  [place], [place], [address] [phone]  [email or web address]           OUTPATIENT CLINIC FOLLOW UP NOTE    Patient Care Team:  Patient Care Team:  Wendy Allen APRN as PCP - General    Subjective:   Reason for consultation:   Chief Complaint   Patient presents with    Coronary artery disease of native artery of native heart wi       HPI:    Gen Torres is a 69 y.o. male.  Cardiac problem list:  Multivessel CAD   Remote stenting  Status post 5 vessel bypass (LIMA to LAD and diag, SVG to PDA, SVG to OM )  ANNA SVG to OM, 2018  No new obstructive disease on LHC 2019  Chronic diastolic heart failure  Sick sinus syndrome status post DC PPM, [facility], Dr. Marina, 8/2020  PVCs  9% burden on heart monitor 2/2020  Persistent shortness of breath of unknown etiology  Patent bypass grafts on heart cath 10/2019  Unremarkable chest x-ray, PFTs 11/2019  Relative bradycardia  Essential hypertension  Mixed hyperlipidemia  Diabetes.      He presents today for follow-up.     Has been doing well from a cardiac standpoint since his last visit.  Denies chest pain, shortness of breath, palpitations    Review of Systems:  As noted in the HPI    PFSH:  Patient Active Problem List   Diagnosis    Obesity    Diabetes mellitus    Mixed hyperlipidemia    Essential hypertension    KAYLEIGH on CPAP    ED (erectile dysfunction)    Chronic diastolic heart failure    Coronary artery disease of native artery of native heart with stable angina pectoris    Dyspnea on exertion    Fatigue    PVC (premature ventricular contraction)    Chronotropic incompetence with sinus node dysfunction    Presence of permanent cardiac pacemaker         Current Outpatient Medications:     aspirin 81 MG tablet, Take 1 tablet by mouth Daily. (Patient taking differently: Take 1 tablet by mouth Every Night.), Disp: 90 tablet, Rfl: 3    atorvastatin (LIPITOR) 80 MG tablet, Take 1 tablet by mouth  "Every Night., Disp: 90 tablet, Rfl: 3    carvedilol (COREG) 12.5 MG tablet, Take 1 tablet by mouth 2 (Two) Times a Day With Meals., Disp: 180 tablet, Rfl: 3    DULoxetine (CYMBALTA) 30 MG capsule, Take 1 capsule by mouth Daily., Disp: , Rfl:     DULoxetine (CYMBALTA) 60 MG capsule, Take 1 capsule by mouth Daily., Disp: , Rfl:     furosemide (LASIX) 20 MG tablet, TAKE 1 TABLET BY MOUTH ONCE DAILY AS NEEDED FOR SWELLING, Disp: 60 tablet, Rfl: 3    levocetirizine (XYZAL) 5 MG tablet, Take 1 tablet by mouth Every Evening., Disp: , Rfl:     losartan (COZAAR) 100 MG tablet, Take 1 tablet by mouth Every Night., Disp: 90 tablet, Rfl: 3    meloxicam (MOBIC) 15 MG tablet, Take 1 tablet by mouth Daily., Disp: , Rfl:     Mounjaro 5 MG/0.5ML solution pen-injector, Inject 1 mL under the skin into the appropriate area as directed Every 7 (Seven) Days., Disp: , Rfl:     omeprazole (priLOSEC) 20 MG capsule, Take 1 capsule by mouth Daily., Disp: , Rfl:     potassium chloride (K-DUR,KLOR-CON) 20 MEQ CR tablet, TAKE 1 TABLET BY MOUTH ONCE DAILY WHEN TAKING FUROSEMIDE, Disp: 30 tablet, Rfl: 3    Allergies   Allergen Reactions    Contrast Dye (Echo Or Unknown Ct/Mr) Shortness Of Breath     Hives, itching    Other Shortness Of Breath     Hives, itching    Iodinated Contrast Media Unknown - Low Severity    Iodine Itching and Rash    Spironolactone Diarrhea     He was not able to tolerate the spironolactone secondary to diarrhea.        reports that he quit smoking about 31 years ago. His smoking use included cigarettes. He started smoking about 57 years ago. He has a 104 pack-year smoking history. He has been exposed to tobacco smoke. He has never used smokeless tobacco.      Objective:   Blood pressure 138/76, pulse 60, height 175.3 cm (69\"), weight 99.4 kg (219 lb 3.2 oz), SpO2 96%.  CONSTITUTIONAL: No acute distress, normal affect  CARDIOVASCULAR: Regular rate and rhythm with normal S1 and S2. Without gallop or rub.  Systolic ejection " murmur at the right upper sternal border not heard today  PERIPHERAL VASCULAR: Normal radial pulses bilaterally.  No carotid bruit bilaterally     Labs:    Lab Results   Component Value Date    CHOL 171 10/22/2019    TRIG 115 10/22/2019    HDL 31 (L) 10/22/2019    CREATININE 0.72 (L) 03/08/2023       Diagnostic Data:      ECG 12 Lead    Date/Time: 3/10/2025 10:53 AM  Performed by: Hari Vargas MD    Authorized by: Hari Vargas MD  Comparison: compared with previous ECG from 2/19/2024  Comparison to previous ECG: Atrial paced, incomplete left bundle branch block, nonspecific T wave abnormality.  PVCs no longer present.  Rhythm: paced          FixetudeO heart monitor 2/2020: PVC burden 9%, symptomatic PVCs on occasion      Results for orders placed during the hospital encounter of 09/30/19    Adult Transthoracic Echo Complete W/ Cont if Necessary Per Protocol    Interpretation Summary  · Left ventricular systolic function is normal. Estimated EF appears to be in the range of 51 - 55%.  · Left ventricular wall thickness is consistent with mild-to-moderate concentric hypertrophy.  · The following left ventricular wall segments are hypokinetic: mid inferolateral, apical inferior and mid inferior.  · Left ventricular diastolic dysfunction (grade I a) consistent with impaired relaxation.  · Left atrial cavity size is moderately dilated.  · Mild mitral valve regurgitation is present    Assessment and Plan:     Coronary artery disease  Mixed hyperlipidemia  -Currently without angina.   -Continue aspirin, carvedilol, atorvastatin  -Routine FLP through PCP office.  LDL slightly above goal.  Long discussion about heart healthy diet and activity.  Patient to think this through.  If continually not at goal, certainly if he continues to have elevating LDL, will consider adding Zetia     Chronic diastolic heart failure  Lower extremity swelling  -Continue beta-blocker and losartan  -Continue Lasix as needed, to be taken with  concomitant potassium     PVCs, 9% burden  Sick sinus syndrome status post pacemaker  -Follow up with EP  -Continue beta-blocker    Essential hypertension  -Continue current medications.     - No follow-ups on file.

## 2025-07-15 ENCOUNTER — PATIENT ROUNDING (BHMG ONLY) (OUTPATIENT)
Dept: CARDIOLOGY | Facility: CLINIC | Age: 70
End: 2025-07-15
Payer: MEDICARE

## 2025-07-15 ENCOUNTER — OFFICE VISIT (OUTPATIENT)
Dept: CARDIOLOGY | Facility: CLINIC | Age: 70
End: 2025-07-15
Payer: MEDICARE

## 2025-07-15 VITALS
HEIGHT: 70 IN | HEART RATE: 63 BPM | WEIGHT: 204.2 LBS | SYSTOLIC BLOOD PRESSURE: 112 MMHG | OXYGEN SATURATION: 98 % | DIASTOLIC BLOOD PRESSURE: 78 MMHG | BODY MASS INDEX: 29.23 KG/M2

## 2025-07-15 DIAGNOSIS — G47.33 OSA (OBSTRUCTIVE SLEEP APNEA): ICD-10-CM

## 2025-07-15 DIAGNOSIS — I49.5 SSS (SICK SINUS SYNDROME): Primary | ICD-10-CM

## 2025-07-15 DIAGNOSIS — I10 ESSENTIAL HYPERTENSION: ICD-10-CM

## 2025-07-15 NOTE — PROGRESS NOTES
Cardiac Electrophysiology Outpatient Follow Up Note            [facility] at [facility]    Follow Up Office Visit      Gen Torres  [id number]  07/15/2025  [unfilled]  [unfilled]    Primary Care Physician: Wendy Allen APRN    Referred By: No ref. provider found    Subjective     CC: SSS    Cardiac problem list:  Sick sinus syndrome   S/p DC PPM, [facility], Dr. Marina, 8/2020  Multivessel CAD   Remote stenting  Status post 5 vessel bypass (LIMA to LAD and diag, SVG to PDA, SVG to OM )  ANNA SVG to OM, 2018  No new obstructive disease on LHC 2019  Chronic diastolic heart failure  PVCs  9% burden on heart monitor 2/2020  Persistent shortness of breath of unknown etiology  Patent bypass grafts on heart cath 10/2019  Unremarkable chest x-ray, PFTs 11/2019  Relative bradycardia  Essential hypertension  Mixed hyperlipidemia  Diabetes.      History of Present Illness:   Gne Torres is a 70 y.o. male who presents to my electrophysiology clinic for follow up of sick sinus syndrome, PVC's.  He has a dual-chamber [employer] permanent pacemaker in situ.      Since the patients last visit he has been doing well.  The patient denies chest pain, chest pressure, chest heaviness, palpitations, shortness of breath, edema, syncope, presyncope.     Past Medical History:   Diagnosis Date    Absent left knee reflex     His wife showed a concern about absent knee reflexes and according to the patient this could have been attributed to his severe back related problem.     CAD (coronary artery disease)     CABG    CHF (congestive heart failure)     Congenital heart disease     Diabetes mellitus     checsk sugar once per week     ED (erectile dysfunction)     Further history revealed that he also has erectile dysfunction which he attributed may be due to s/p prostatic cancer.    GERD (gastroesophageal reflux disease)     Headache     Hyperlipidemia     Hypertension     Lower extremity  edema      This seems to be fairly resolved at this point.     Myocardial infarction     x2 1994 then [id number] and had bypass after     Neuropathy     Obesity     KAYLEIGH on CPAP     cpap- compliant with machine     Prostate cancer     SOB (shortness of breath)       During his first visit, the patient mentioned having very occasional shortness of air which he correlates with gaining weight.  This seems to be fairly resolved at this point.     Wears glasses     readers       Past Surgical History:   Procedure Laterality Date    CARDIAC CATHETERIZATION      CARDIAC CATHETERIZATION N/A 05/09/2018    Procedure: Left Heart Cath;  Surgeon: Hari Vargas MD;  Location:  [facility];  Service: Cardiovascular    CARDIAC CATHETERIZATION N/A 05/09/2018    Procedure: Coronary angiography;  Surgeon: Hari Vargas MD;  Location:  [facility];  Service: Cardiovascular    CARDIAC CATHETERIZATION N/A 10/22/2019    Procedure: Left Heart Cath;  Surgeon: Hari Vargas MD;  Location:  [facility];  Service: Cardiology    CARDIAC ELECTROPHYSIOLOGY PROCEDURE N/A 09/01/2020    Procedure: DDD PPM Implant ([facility]);  Surgeon: Sandeep Marina DO;  Location:  [facility]VAS[facility];  Service: Cardiology;  Laterality: N/A;    CARDIAC SURGERY      COLONOSCOPY      CORONARY ARTERY BYPASS GRAFT      x5 vessles     CORONARY STENT PLACEMENT  01/01/17    ENDOSCOPY      INSERT / REPLACE / REMOVE PACEMAKER  2019    PROSTATECTOMY      Status post radical prostatectomy for carcinoma.    WISDOM TOOTH EXTRACTION      1 remain pt thinks        Family History   Problem Relation Age of Onset    Cancer Mother     Heart attack Father     Heart disease Father     Cancer Father     Heart disease Sister     Diabetes Sister     Heart disease Brother        Social History     Socioeconomic History    Marital status:    Tobacco Use    Smoking status: Former     Current packs/day: 0.00     Average packs/day: 4.0  packs/day for 26.0 years (104.0 ttl pk-yrs)     Types: Cigarettes     Start date: 1968     Quit date: 1994     Years since quittin.5     Passive exposure: Past    Smokeless tobacco: Never   Vaping Use    Vaping status: Never Used   Substance and Sexual Activity    Alcohol use: No    Drug use: No    Sexual activity: Not Currently     Partners: Female         Current Outpatient Medications:     aspirin 81 MG tablet, Take 1 tablet by mouth Daily. (Patient taking differently: Take 1 tablet by mouth Every Night.), Disp: 90 tablet, Rfl: 3    atorvastatin (LIPITOR) 80 MG tablet, Take 1 tablet by mouth Every Night., Disp: 90 tablet, Rfl: 3    carvedilol (COREG) 12.5 MG tablet, Take 1 tablet by mouth 2 (Two) Times a Day With Meals., Disp: 180 tablet, Rfl: 3    DULoxetine (CYMBALTA) 30 MG capsule, Take 1 capsule by mouth Daily., Disp: , Rfl:     DULoxetine (CYMBALTA) 60 MG capsule, Take 1 capsule by mouth Daily., Disp: , Rfl:     furosemide (LASIX) 20 MG tablet, TAKE 1 TABLET BY MOUTH ONCE DAILY AS NEEDED FOR SWELLING, Disp: 60 tablet, Rfl: 3    levocetirizine (XYZAL) 5 MG tablet, Take 1 tablet by mouth Every Evening., Disp: , Rfl:     losartan (COZAAR) 100 MG tablet, Take 1 tablet by mouth Every Night., Disp: 90 tablet, Rfl: 3    meloxicam (MOBIC) 15 MG tablet, Take 1 tablet by mouth Daily., Disp: , Rfl:     Mounjaro 5 MG/0.5ML solution pen-injector, Inject 1 mL under the skin into the appropriate area as directed Every 7 (Seven) Days., Disp: , Rfl:     omeprazole (priLOSEC) 20 MG capsule, Take 1 capsule by mouth Daily., Disp: , Rfl:     potassium chloride (K-DUR,KLOR-CON) 20 MEQ CR tablet, TAKE 1 TABLET BY MOUTH ONCE DAILY WHEN TAKING FUROSEMIDE, Disp: 30 tablet, Rfl: 3    Allergies   Allergen Reactions    Contrast Dye (Echo Or Unknown Ct/Mr) Shortness Of Breath     Hives, itching    Other Shortness Of Breath     Hives, itching    Iodinated Contrast Media Unknown - Low Severity    Iodine Itching and Rash     "Spironolactone Diarrhea     He was not able to tolerate the spironolactone secondary to diarrhea.       Objective     Vitals:    07/15/25 0935   BP: 112/78   BP Location: Left arm   Patient Position: Sitting   Cuff Size: Adult   Pulse: 63   SpO2: 98%   Weight: 92.6 kg (204 lb 3.2 oz)   Height: 177.8 cm (70\")     Body mass index is 29.3 kg/m².    Cardiovascular:      PMI at left midclavicular line. Normal rate. Regular rhythm. Normal S1. Normal S2.       No gallop.  No click. No rub.   Pulses:     Intact distal pulses.   Edema:     Peripheral edema absent.         Lab Results   Component Value Date    GLUCOSE 98 03/08/2023    CALCIUM 9.1 03/08/2023     03/08/2023    K 4.3 03/08/2023    CO2 28.0 03/08/2023     03/08/2023    BUN 12 03/08/2023    CREATININE 0.72 (L) 03/08/2023    EGFRIFNONA 78 09/01/2020    BCR 16.7 03/08/2023    ANIONGAP 10.0 03/08/2023     Lab Results   Component Value Date    WBC 5.71 03/08/2023    HGB 15.2 03/08/2023    HCT 43.0 03/08/2023    MCV 96.4 03/08/2023     (L) 03/08/2023     Lab Results   Component Value Date    INR 1.01 05/08/2018    INR 1.04 01/02/2015    PROTIME 10.6 05/08/2018    PROTIME 11.1 01/02/2015     No results found for: \"TSH\", \"A1WVEBO\", \"O1TVNGH\", \"THYROIDAB\"    Cardiac Testing:     I personally viewed and interpreted the patient's EKG/Telemetry/lab data.    Procedures    Tobacco Cessation: N/A  Obstructive Sleep Apnea Screening: Completed    Advance Care Planning   ACP discussion was held with the patient during this visit. Patient does not have an advance directive, declines further assistance.       Assessment & Plan      Assessment & Plan  SSS (sick sinus syndrome)  DEVICE INTERROGATION:  BSC: RA pacing 38%, RV pacing <1%. P wave is 8.8 mV with a threshold of 0.8 V at 0.4 msec and an impedance of 627 ohms. R wave is 11.3 mV with a threshold of 0.5 V at 0.4 msec and an impedance of 701 ohms. Battery voltage is 5.5. Events: none     No changes in current " medication regimen       KAYLEIGH (obstructive sleep apnea)  CPAP complaint        Essential hypertension  Today's /78               Follow Up:   No follow-ups on file.    Thank you for allowing me to participate in the care of your patient. Please to not hesitate to contact me with additional questions or concerns.      GEOFF Hoskins  [facility]

## 2025-07-15 NOTE — PROGRESS NOTES
"July 15, 2025    Hello, may I speak with Gen Torres? Yes.    My name is Jennifer IRVING        I am with:   [facility]  [address]  [address]  [place] [address]  [phone].  [facility] Dept : [facility]    Before we get started may I verify your date of birth? 1955, Yes, correct.    \"I see today was your visit with JULISA Marinelli. Welcome to [facility]. We want to ensure that every patient has the best experience from scheduling to check in to their visit with the providers so before we get started with Check Out, is it ok to ask you a few questions regarding your experience?\"  yes    Tell me about your visit with us. What things went well?  Everything; everyone was so kind, nice and prompt/on time.     We're always looking for ways to make our patients' experiences even better. Do you have recommendations on ways we may improve?  no; doing good-no complaints.    Overall were you satisfied with your first visit to our practice? Yes; oh yeah!    I appreciate you taking the time to speak with me today. Is there anything else I can do for you? no       Thank you, and have a great day.   "

## (undated) DEVICE — PK CATH CARD 10

## (undated) DEVICE — SET PRIMARY GRVTY 10DP MALE LL 104IN

## (undated) DEVICE — CANNULA,ADULT,SOFT-TOUCH,7'TUBE,UC: Brand: PENDING

## (undated) DEVICE — DRSNG SURESITE123 4X4.8IN

## (undated) DEVICE — LIMB HOLDER, WRIST/ANKLE: Brand: DEROYAL

## (undated) DEVICE — CATH DIAG IMPULSE IMT 5F 100CM

## (undated) DEVICE — CAUTERY TIP POLISHER: Brand: DEVON

## (undated) DEVICE — CATH DIAG EXPO M/ PK 5F FL4/FR4 PIG

## (undated) DEVICE — INTRO SHEATH PRELUDE IDEAL SPRNG COIL 021 6F 23X80CM

## (undated) DEVICE — NC TREK™ CORONARY DILATATION CATHETER 3.75 MM X 8 MM / RAPID-EXCHANGE: Brand: NC TREK™

## (undated) DEVICE — DRSNG TELFA PAD NONADH STR 1S 3X8IN

## (undated) DEVICE — SOL NACL 0.9PCT 1000ML

## (undated) DEVICE — INTRO TEAR AWAY/LVD W/SD PRT 6F 13CM

## (undated) DEVICE — MEDI-VAC YANKAUER SUCTION HANDLE W/BULBOUS TIP: Brand: CARDINAL HEALTH

## (undated) DEVICE — DECANT BG O JET

## (undated) DEVICE — DRSNG PRESS SAFEGUARD

## (undated) DEVICE — ST EXT IV SMARTSITE 2VLV SP M LL 5ML IV1

## (undated) DEVICE — CATH DIAG EXPO .045 FL3  5F 100CM

## (undated) DEVICE — DEV COMP RAD PRELUDESYNC 24CM

## (undated) DEVICE — INTRAOPERATIVE COVER KIT, 10 PACK: Brand: SITE-RITE

## (undated) DEVICE — SOL ANTISEP SCRB PVPI 7.5PCT 4OZ

## (undated) DEVICE — GW FC FLOP/TP .035 260CM 3MM

## (undated) DEVICE — TUBING, SUCTION, 1/4" X 10', STRAIGHT: Brand: MEDLINE

## (undated) DEVICE — ST INF PRI SMRTSTE 20DRP 2VLV 24ML 117

## (undated) DEVICE — GUIDE CATHETER: Brand: MACH1™

## (undated) DEVICE — SKIN PREP TRAY W/CHG: Brand: MEDLINE INDUSTRIES, INC.

## (undated) DEVICE — CATH INTRAVAS ULTRASND EAGLE EYE 2.9FR

## (undated) DEVICE — Device

## (undated) DEVICE — DEV INFL MONARCH 25W

## (undated) DEVICE — LEX ELECTRO PHYSIOLOGY: Brand: MEDLINE INDUSTRIES, INC.

## (undated) DEVICE — MODEL AT P65, P/N 701554-001KIT CONTENTS: HAND CONTROLLER, 3-WAY HIGH-PRESSURE STOPCOCK WITH ROTATING END AND PREMIUM HIGH-PRESSURE TUBING: Brand: ANGIOTOUCH® KIT

## (undated) DEVICE — RADIFOCUS GLIDEWIRE ADVANTAGE GUIDEWIRE: Brand: GLIDEWIRE ADVANTAGE

## (undated) DEVICE — MODEL BT2000 P/N 700287-012KIT CONTENTS: MANIFOLD WITH SALINE AND CONTRAST PORTS, SALINE TUBING WITH SPIKE AND HAND SYRINGE, TRANSDUCER: Brand: BT2000 AUTOMATED MANIFOLD KIT

## (undated) DEVICE — CATH DIAG EXPO .045 AL1 5F 100CM

## (undated) DEVICE — GW PRESS VERRATA STR 185CM 10185P

## (undated) DEVICE — ADULT, W/LG. BACK PAD, RADIOTRANSPARENT ELEMENT AND LEAD WIRE: Brand: DEFIBRILLATION ELECTRODES

## (undated) DEVICE — TREK CORONARY DILATATION CATHETER 4.0 MM X 8 MM / RAPID-EXCHANGE: Brand: TREK

## (undated) DEVICE — CANN NASL CO2 DIVIDED A/

## (undated) DEVICE — CATH DIAG EXPO .045 FL3.5 5F 100CM

## (undated) DEVICE — GW PERIPH GUIDERIGHT STD/EXCHNG/J/TIP SS 0.035IN 5X260CM

## (undated) DEVICE — GUIDELINER CATHETERS ARE INTENDED TO BE USED IN CONJUNCTION WITH GUIDE CATHETERS TO ACCESS DISCRETE REGIONS OF THE CORONARY AND/OR PERIPHERAL VASCULATURE, AND TO FACILITATE PLACEMENT OF INTERVENTIONAL DEVICES.: Brand: GUIDELINER® V3 CATHETER

## (undated) DEVICE — GLIDESHEATH SLENDER STAINLESS STEEL KIT: Brand: GLIDESHEATH SLENDER